# Patient Record
Sex: MALE | Race: AMERICAN INDIAN OR ALASKA NATIVE | NOT HISPANIC OR LATINO | Employment: UNEMPLOYED | URBAN - METROPOLITAN AREA
[De-identification: names, ages, dates, MRNs, and addresses within clinical notes are randomized per-mention and may not be internally consistent; named-entity substitution may affect disease eponyms.]

---

## 2019-06-09 ENCOUNTER — HOSPITAL ENCOUNTER (EMERGENCY)
Facility: CLINIC | Age: 39
Discharge: HOME | End: 2019-06-09
Attending: EMERGENCY MEDICINE | Admitting: EMERGENCY MEDICINE
Payer: COMMERCIAL

## 2019-06-09 VITALS
OXYGEN SATURATION: 100 % | TEMPERATURE: 98.3 F | DIASTOLIC BLOOD PRESSURE: 100 MMHG | BODY MASS INDEX: 26.5 KG/M2 | WEIGHT: 190 LBS | RESPIRATION RATE: 24 BRPM | SYSTOLIC BLOOD PRESSURE: 171 MMHG

## 2019-06-09 DIAGNOSIS — R06.00 DYSPNEA, UNSPECIFIED TYPE: ICD-10-CM

## 2019-06-09 PROCEDURE — 99284 EMERGENCY DEPT VISIT MOD MDM: CPT | Mod: Z6 | Performed by: EMERGENCY MEDICINE

## 2019-06-09 PROCEDURE — 94640 AIRWAY INHALATION TREATMENT: CPT

## 2019-06-09 PROCEDURE — 25000125 ZZHC RX 250

## 2019-06-09 PROCEDURE — 99283 EMERGENCY DEPT VISIT LOW MDM: CPT | Mod: 25

## 2019-06-09 RX ORDER — ALBUTEROL SULFATE 0.83 MG/ML
SOLUTION RESPIRATORY (INHALATION)
Status: COMPLETED
Start: 2019-06-09 | End: 2019-06-09

## 2019-06-09 RX ADMIN — ALBUTEROL SULFATE 2.5 MG: 2.5 SOLUTION RESPIRATORY (INHALATION) at 13:35

## 2019-06-09 ASSESSMENT — ENCOUNTER SYMPTOMS
HEADACHES: 0
NECK STIFFNESS: 0
SHORTNESS OF BREATH: 1
EYE REDNESS: 0
ARTHRALGIAS: 0
DIFFICULTY URINATING: 0
ABDOMINAL PAIN: 0
COUGH: 1
COLOR CHANGE: 0
EYE ITCHING: 1
RHINORRHEA: 1
FEVER: 0
NUMBNESS: 1
CONFUSION: 0

## 2019-06-09 NOTE — ED NOTES
Staff unable to locate patient on unit.  Checked with registration if they saw patient and mother leave.  MD notifed patient eloped before chest xray

## 2019-06-09 NOTE — ED PROVIDER NOTES
"  History     Chief Complaint   Patient presents with     Shortness of Breath     SOB and cough  that started this AM.      The history is provided by the patient.   Shortness of Breath   Associated symptoms: cough (productive; clear mucus)    Associated symptoms: no abdominal pain, no chest pain, no fever and no headaches      Ez Mckeon is a 39 year old male who presents to the ED for shortness of breath. He reports his SOB began this morning at 9:30 AM and is accompanied by tingling in his fingertips and lips as well as a productive cough with clear mucus. He states \"I felt like my brain couldn't tell my body to breathe\". He reports he was unable to take a deep breath. He attempted to alleviate his sx using 2 puffs of his sister's inhaler. He also endorses allergy sx; runny nose, sneezing, and itchy/watery eyes.    I have reviewed the Medications, Allergies, Past Medical and Surgical History, and Social History in the Epic system.    Review of Systems   Constitutional: Negative for fever.   HENT: Positive for rhinorrhea and sneezing. Negative for congestion.    Eyes: Positive for itching (watery). Negative for redness.   Respiratory: Positive for cough (productive; clear mucus) and shortness of breath.    Cardiovascular: Negative for chest pain.   Gastrointestinal: Negative for abdominal pain.   Genitourinary: Negative for difficulty urinating.   Musculoskeletal: Negative for arthralgias and neck stiffness.   Skin: Negative for color change.   Neurological: Positive for numbness (tingling in fingertips and lips). Negative for headaches.   Psychiatric/Behavioral: Negative for confusion.       Physical Exam   BP: (!) 171/100  Heart Rate: 103  Temp: 98.3  F (36.8  C)  Resp: 24  Weight: 86.2 kg (190 lb)  SpO2: 100 %      Physical Exam   Constitutional: No distress.   HENT:   Head: Atraumatic.   Mouth/Throat: Oropharynx is clear and moist.   Eyes: Pupils are equal, round, and reactive to light. No scleral icterus. "   Cardiovascular: Normal heart sounds and intact distal pulses.   Pulmonary/Chest: Breath sounds normal. No respiratory distress.   Abdominal: Soft. Bowel sounds are normal. There is no tenderness.   Musculoskeletal: He exhibits no edema or tenderness.   Skin: Skin is warm. No rash noted. He is not diaphoretic.       ED Course        Procedures             Labs Ordered and Resulted from Time of ED Arrival Up to the Time of Departure from the ED - No data to display         Assessments & Plan (with Medical Decision Making)   39-year-old male presents for evaluation of shortness of breath.  Differential included bronchospasm, PE, pneumothorax, hyperventilation, pneumonia.  Initial exam was reassuring and that he had bilateral lung sounds that appeared normal and oxygen saturation was 100%.  We discussed treatment plan to include chest x-ray.  Patient eloped prior to chest x-ray being obtained.  Patient returns, will resume work-up.    I have reviewed the nursing notes.    I have reviewed the findings, diagnosis, plan and need for follow up with the patient.       Medication List      There are no discharge medications for this visit.         Final diagnoses:   Dyspnea, unspecified type   I, Jeanette Dias, am serving as a trained medical scribe to document services personally performed by Hemant Smith MD, based on the provider's statements to me.      I, Hemant Smith MD, was physically present and have reviewed and verified the accuracy of this note documented by Jeanette Dias.       6/9/2019   [facility], [place], EMERGENCY DEPARTMENT     Brando Smith MD  06/09/19 5286

## 2019-08-11 ENCOUNTER — HOSPITAL ENCOUNTER (INPATIENT)
Facility: CLINIC | Age: 39
LOS: 1 days | Discharge: HOME | End: 2019-08-13
Attending: EMERGENCY MEDICINE | Admitting: PSYCHIATRY & NEUROLOGY
Payer: COMMERCIAL

## 2019-08-11 DIAGNOSIS — T14.91XA SUICIDE ATTEMPT (H): ICD-10-CM

## 2019-08-11 DIAGNOSIS — F10.129 ALCOHOL ABUSE WITH INTOXICATION WITH COMPLICATION (H): ICD-10-CM
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
## 2019-08-11 LAB
ALCOHOL BREATH TEST: 0.25 (ref 0–0.01)
AMPHETAMINES UR QL SCN: NEGATIVE
BARBITURATES UR QL: NEGATIVE
BENZODIAZ UR QL: NEGATIVE
CANNABINOIDS UR QL SCN: NEGATIVE
COCAINE UR QL: NEGATIVE
ETHANOL UR QL SCN: POSITIVE
OPIATES UR QL SCN: NEGATIVE

## 2019-08-11 PROCEDURE — 99285 EMERGENCY DEPT VISIT HI MDM: CPT | Mod: Z6 | Performed by: EMERGENCY MEDICINE

## 2019-08-11 PROCEDURE — 80307 DRUG TEST PRSMV CHEM ANLYZR: CPT | Performed by: FAMILY MEDICINE

## 2019-08-11 PROCEDURE — 80320 DRUG SCREEN QUANTALCOHOLS: CPT | Performed by: FAMILY MEDICINE

## 2019-08-11 PROCEDURE — 99285 EMERGENCY DEPT VISIT HI MDM: CPT | Performed by: EMERGENCY MEDICINE

## 2019-08-11 PROCEDURE — 82075 ASSAY OF BREATH ETHANOL: CPT | Performed by: EMERGENCY MEDICINE

## 2019-08-11 ASSESSMENT — ENCOUNTER SYMPTOMS
HALLUCINATIONS: 0
DYSPHORIC MOOD: 1

## 2019-08-12 PROBLEM — T71.162A SUICIDE ATTEMPT BY HANGING (H): Status: ACTIVE | Noted: 2019-08-12

## 2019-08-12 PROCEDURE — 99223 1ST HOSP IP/OBS HIGH 75: CPT | Mod: AI | Performed by: PSYCHIATRY & NEUROLOGY

## 2019-08-12 PROCEDURE — 12400001 ZZH R&B MH UMMC

## 2019-08-12 RX ORDER — OLANZAPINE 10 MG/1
10 TABLET ORAL
Status: DISCONTINUED | OUTPATIENT
Start: 2019-08-12 | End: 2019-08-13

## 2019-08-12 RX ORDER — BISACODYL 10 MG
10 SUPPOSITORY, RECTAL RECTAL DAILY PRN
Status: DISCONTINUED | OUTPATIENT
Start: 2019-08-12 | End: 2019-08-13 | Stop reason: HOSPADM

## 2019-08-12 RX ORDER — OLANZAPINE 10 MG/2ML
10 INJECTION, POWDER, FOR SOLUTION INTRAMUSCULAR
Status: DISCONTINUED | OUTPATIENT
Start: 2019-08-12 | End: 2019-08-13

## 2019-08-12 RX ORDER — ALUMINA, MAGNESIA, AND SIMETHICONE 2400; 2400; 240 MG/30ML; MG/30ML; MG/30ML
30 SUSPENSION ORAL EVERY 4 HOURS PRN
Status: DISCONTINUED | OUTPATIENT
Start: 2019-08-12 | End: 2019-08-13 | Stop reason: HOSPADM

## 2019-08-12 RX ORDER — TRAZODONE HYDROCHLORIDE 50 MG/1
50 TABLET, FILM COATED ORAL
Status: DISCONTINUED | OUTPATIENT
Start: 2019-08-12 | End: 2019-08-13 | Stop reason: HOSPADM

## 2019-08-12 RX ORDER — HYDROXYZINE HYDROCHLORIDE 25 MG/1
25 TABLET, FILM COATED ORAL EVERY 4 HOURS PRN
Status: DISCONTINUED | OUTPATIENT
Start: 2019-08-12 | End: 2019-08-13 | Stop reason: HOSPADM

## 2019-08-12 RX ORDER — ACETAMINOPHEN 325 MG/1
650 TABLET ORAL EVERY 4 HOURS PRN
Status: DISCONTINUED | OUTPATIENT
Start: 2019-08-12 | End: 2019-08-13 | Stop reason: HOSPADM

## 2019-08-12 ASSESSMENT — ACTIVITIES OF DAILY LIVING (ADL)
BATHING: 0-->INDEPENDENT
LAUNDRY: WITH SUPERVISION
RETIRED_EATING: 0-->INDEPENDENT
HYGIENE/GROOMING: INDEPENDENT
RETIRED_COMMUNICATION: 0-->UNDERSTANDS/COMMUNICATES WITHOUT DIFFICULTY
DRESS: INDEPENDENT
ORAL_HYGIENE: INDEPENDENT
FALL_HISTORY_WITHIN_LAST_SIX_MONTHS: NO
AMBULATION: 0-->INDEPENDENT
COGNITION: 0 - NO COGNITION ISSUES REPORTED
TOILETING: 0-->INDEPENDENT
DRESS: 0-->INDEPENDENT
SWALLOWING: 0-->SWALLOWS FOODS/LIQUIDS WITHOUT DIFFICULTY
TRANSFERRING: 0-->INDEPENDENT

## 2019-08-12 NOTE — ED PROVIDER NOTES
History     Chief Complaint   Patient presents with     Suicidal     Patient drinking this evening. Patient suicidal, and attempted to wrap extension cord around neck this evening. No ligature marks. Respirations even and unlabored. Skin warm and dry.      PALOMO Mckeon is a 39 year old male who who presents to the ED after attempting to hang himself.  He says he has been thinking about this for about 4 weeks when his job hours were dropped significantly.  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  The loss of hours has been very emasculating.  He says he has a teen age daughter who is smoking and drinking.  He feels badly that he doesn't have money to give his daughter things that she wants.  He has been also taking care of his girlfriend's kids.  It has all been very stressful.  He says he is tired of the hamster wheel.  He tried to hang himself several years ago but his dad cut him down. He was drinking today but he doesn't drink regularly.  He says he doesn't believe in taking meds.  He has no providers.       I have reviewed the Medications, Allergies, Past Medical and Surgical History, and Social History in the Epic system.    Review of Systems   Psychiatric/Behavioral: Positive for dysphoric mood and suicidal ideas. Negative for hallucinations.   All other systems reviewed and are negative.      Physical Exam   BP: 122/80  Pulse: 78  Temp: 96.6  F (35.9  C)  Resp: 16  SpO2: 98 %      Physical Exam   Constitutional: He is oriented to person, place, and time. He appears well-developed and well-nourished. No distress.   Normal voice. Tolerates saliva without issue.    HENT:   Head: Normocephalic and atraumatic.   Right Ear: External ear normal.   Left Ear: External ear normal.   Nose: Nose normal.   Eyes: EOM are normal.   Neck: Normal range of motion.   No abrasions/injury   Cardiovascular: Normal rate, regular rhythm and normal heart sounds.   Pulmonary/Chest: Effort normal and breath sounds normal.    Musculoskeletal: Normal range of motion.   Neurological: He is alert and oriented to person, place, and time.   Skin: Skin is warm and dry. He is not diaphoretic.   Psychiatric: His speech is normal and behavior is normal. Judgment normal. He is not actively hallucinating. Cognition and memory are normal. He exhibits a depressed mood. He expresses suicidal ideation. He expresses suicidal plans. He is attentive.   Nursing note and vitals reviewed.      ED Course        Procedures           Labs Ordered and Resulted from Time of ED Arrival Up to the Time of Departure from the ED   DRUG ABUSE SCREEN 6 CHEM DEP URINE ([facility]) - Abnormal; Notable for the following components:       Result Value    Ethanol Qual Urine Positive (*)     All other components within normal limits   ALCOHOL BREATH TEST POCT - Abnormal; Notable for the following components:    Alcohol Breath Test 0.248 (*)     All other components within normal limits            Assessments & Plan (with Medical Decision Making)   The patient attempted to hang himself today but his dad walked in on him before he could complete the act.  He did not have stress on his neck yet so denies injury.  He is overwhelmed with life's hamster wheel.  He says he has been thinking about killing himself for about 4 weeks.  I am placing him on a 72 hour hold and will admit to inpatient mental health.  The patient says he drank tonight but does not drink regularly.      I have reviewed the nursing notes.    I have reviewed the findings, diagnosis, plan and need for follow up with the patient.    New Prescriptions    No medications on file       Final diagnoses:   Suicide attempt (H)       8/11/2019   [facility], [place], EMERGENCY DEPARTMENT     Vernell Hood MD  08/11/19 6827

## 2019-08-12 NOTE — PROGRESS NOTES
08/12/19 0257   Patient Belongings   Did you bring any home meds/supplements to the hospital?  No   Patient Belongings locker   Patient Belongings Put in Hospital Secure Location (Security or Locker, etc.) clothing;earrings;money (see comment);shoes;other (see comments)   Belongings Search Yes   Clothing Search Yes   Second Staff Amarilis     In Locker:  Black shorts, grey shirt, white shoes, black socks, small plastic baggie with $1.19 in change, one earring, and a green lighter.   There was no wallet, cell phone, or ID cards.       A               Admission:  I am responsible for any personal items that are not sent to the safe or pharmacy.  [facility] is not responsible for loss, theft or damage of any property in my possession.    Signature:  _________________________________ Date: _______  Time: _____                                              Staff Signature:  ____________________________ Date: ________  Time: _____      2nd Staff person, if patient is unable/unwilling to sign:    Signature: ________________________________ Date: ________  Time: _____     Discharge:  [facility] has returned all of my personal belongings:    Signature: _________________________________ Date: ________  Time: _____                                          Staff Signature:  ____________________________ Date: ________  Time: _____

## 2019-08-12 NOTE — ED NOTES
Bed: HW01  Expected date:   Expected time:   Means of arrival:   Comments:  Jacques 427  39 M  Suicide attempt, ETOH

## 2019-08-12 NOTE — H&P
Admitted:     08/11/2019      CHIEF COMPLAINT:  The patient is a 39-year-old man who was admitted due to worsening depression and suicide attempt.      HISTORY OF PRESENT ILLNESS:  The patient is a 39-year-old man who was admitted to the hospital after his father stopped him from wrapping an extension cord around his neck.  In the ER, he did not have any ligature marks, no problems with respiration.  No sign that he actually did any harm.  The patient indicates that his father stopped him.  This is the second time in his life that his father has stopped a suicide attempt.  Previously he cut off an extension cord that he had wrapped around his neck.  He was not hospitalized at that time, just evaluated in the emergency room and sent home.  The patient indicates that he has been feeling more depressed over the last few weeks.  He had his hours cut back at work because of problems with the business.  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  He states that they have been slowly losing business to the bigger national chains, and because of that there just has not been as much work for him.  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  Because of the problems with work, he has been unable to pay the bills as well as she has in the past, so he feels like he is not being a good provider.  Additionally, he has a 15-year-old daughter who has been drinking and smoking.  She is wanting to live with his mom.  He is worried about her behaviors.  States that his ex-wife has smoked marijuana with her before, which really frustrates him as his ex-wife also has custody of a 6-year-old daughter of his that he has not been able to see since they got a divorce.  The patient was very angry about the situation.  He reports to feeling more depressed just over the last 2  "weeks prior to having his work hours cut back and having financial difficulties.  He was feeling fine.  He is not really interested in medications.  He does say that he went to Confucianism the last few weeks, which is helpful.  He is somewhat open to a therapist.  He denies suicidal thoughts currently.  Feels that he would be safe to go home, but understands that we want to watch him for a little bit given that he was admitted on a hold and did have a suicide attempt.      PAST PSYCHIATRIC HISTORY:  No past inpatient hospitalizations, does not currently have a therapist or psychiatrist.   One past suicide attempt via hanging several years ago.  He also reports an episode where he burned himself.  He states that he did so because his wife at the time would always self-deprecate, had low self-esteem because of her appearance, and so he thought if he made himself \"ugly\" that she would feel better.      PAST MEDICAL HISTORY:  The patient denies any chronic or acute medical issues.      SUBSTANCE HISTORY:  The patient smokes a pack of cigarettes daily.  He drinks socially, but not to the point of intoxication.  Denies any illicit drug use.      PHYSICAL REVIEW OF SYSTEMS:  The patient currently denies any problems on 10-point review of systems except as noted in HPI.      FAMILY HISTORY:  The patient reports that his sister  due to liver failure of unknown cause.  He denies it was alcohol-related.      SOCIAL HISTORY:  The patient currently lives with his girlfriend and her 9-year-old and 4-year-old children.  He is , as noted in the HPI.  He is a  for a moving company, as noted in HPI.  He states that he is close to his mom and dad, and his 15-year-old daughter likes to stay with his mom.      ALLERGIES:  No known drug allergies.      PRIOR TO ADMISSION MEDICATIONS:  None.      LABORATORY DATA:  Urine toxicology is positive for alcohol.  Alcohol breath was 0.248.  Urine toxicology is negative for " amphetamines, cocaine, opiates, cannabinoids, barbiturates, benzodiazepines.      VITAL SIGNS:  Temperature 97, pulse is 72, respiratory rate is 16, blood pressure is 146/81, oxygen saturation 98% on room air.      PHYSICAL EXAMINATION:  I have reviewed physical exam as documented by emergency room physician, Vernell Hood, dated 08/11/2019.  I have no additional findings at this time.      MENTAL STATUS EXAMINATION:  The patient is awake, alert, oriented to person, place and date.  He is wearing hospital scrubs.  He is cooperative throughout the interview with good eye contact.  Speech is normal in rate and volume.  Language is intact for word usage and sentence structure.  He has no psychomotor agitation or retardation.  Muscle strength and tone and gait and station are within normal limits.  Mood is depressed.  Affect is congruent to mood.  Thought process is linear and goal oriented.  Associations are intact.  Thought content is currently negative for suicidal thoughts, no signs of psychosis.  Recent and remote memory are intact.  Fund of knowledge is adequate.  Attention and concentration are intact.  Insight is fair, judgment is limited.      DIAGNOSES:   1.  Suicide attempt via strangulation.   2.  Unspecified depressive disorder.  Differential includes major depressive disorder versus adjustment disorder with depressed mood versus acute reaction to stress.   3.  Cigarette nicotine dependence, currently in withdrawal.      PLAN:  The patient is not interested in medication at this time.  He does not support symptoms of depression prior to having his hours cut, so it is not clear he would benefit regardless.  However, he would likely benefit from therapy to help with coping strategies in response to these stressful situations as his past behaviors have demonstrated significant self-destructive and self-harm responses to stress, even when not significantly depressed.  As such,  will help with referral  for treatment.      LEGAL:  The patient was admitted on a 72-hour hold.  Anticipate that we will drop the hold and discharge the patient; however, I do want to watch him overnight just to make sure that we do not see signs that he is at imminent risk.      DISPOSITION:  Anticipate patient will discharge to home after a sufficient period of time and evaluation.         CHAUNCEY GONZALES MD             D: 2019   T: 2019   MT: WT      Name:     JEREMY RIVERA   MRN:      [id number]        Account:      [id number]   :      1980        Admitted:     2019                   Document: [id number]       cc: [facility]

## 2019-08-12 NOTE — PROGRESS NOTES
Patient is calm and cooperative. He was in his room for most of the day but he came out for meals and to call his girlfriend. He has not been going to groups and has just been sleeping most of the day. He denies depression anxiety and SI/SIB. All he has to say is that he is sad and has a lot on his plate. No other concerns at this time.

## 2019-08-12 NOTE — ED NOTES
I have performed an in person assessment of the patient.  Based on this assessment the patient no longer requires a one on one attendant at this point in time.        Vernell Hood MD  08/11/19 8175

## 2019-08-12 NOTE — PROGRESS NOTES
Initial Psychosocial Assessment    I have reviewed the chart, met with the patient, and developed Care Plan.      Presenting Problem:  The patient is a 39 year-old male who presented to the ED after trying to hang himself.  He lost hours at his employment and is having trouble with daughter who is smoking and drinking. Feels inadequate unable to provide for his family.  Doesn't believe in taking meds.  Patient attempted to hang himself (no ligature marks noted) on day of admit but father found him. On admit to the ED he asked a  if he had a gun?   asked why?  Patient said he wanted to attack the  so he could shoot him. He was guarded on admit and his Utox was positive for alcohol - BAL 0.248 on admit.  Patient on 72 Hour Hold on 8/11 at 1126 and ends on 8/15 at 0001.     History of Mental Health and Chemical Dependency:  No othr inpatient admission here at [facility].    Family Description (Constellation, Family Psychiatric History):  The patient is single with a girlfriend.  He has a teenage daughter and there is stress between them due to her smoking and drinking.    Significant Life Events (Illness, Abuse, Trauma, Death):  Stress of life.    Living Situation:  Patient lives in [place].    Educational Background:  High School    Occupational History:  Works at [employer]    Financial Status:  Wages    Legal Issues:  None    Ethnic/Cultural Issues:      Spiritual Orientation:       Service History:  None    Current Treatment Providers are:  None    Social Service Assessment/Plan:  The patient needs psychiatric assessment, medication management, detox complete and stabilization of his mood.  He will be encouraged to be out of his room and participating in all of the unit activities and therapeutic programming.  If patient wants CD treatment, he is uninsured and will need a Rule 25 assessment through [facility]. CTC to  ensure that he has a comprehensve care plan in place at discharge.

## 2019-08-12 NOTE — ED NOTES
ED to Behavioral Floor Handoff    SITUATION  Ez Mckeon is a 39 year old male who speaks English and lives in a home with others The patient arrived in the ED by ambulance from home with a complaint of Suicidal (Patient drinking this evening. Patient suicidal, and attempted to wrap extension cord around neck this evening. No ligature marks. Respirations even and unlabored. Skin warm and dry. )  .The patient's current symptoms started/worsened 1 day(s) ago and during this time the symptoms have increased.   In the ED, pt was diagnosed with   Final diagnoses:   Suicide attempt (H)        Initial vitals were: BP: 122/80  Pulse: 78  Temp: 96.6  F (35.9  C)  Resp: 16  SpO2: 98 %   --------  Is the patient diabetic? No   If yes, last blood glucose? --     If yes, was this treated in the ED? --  --------  Is the patient inebriated (ETOH) Yes or Impaired on other substances? No  MSSA done? No  Last MSSA score: --    Were withdrawal symptoms treated? No  Does the patient have a seizure history? No. If yes, date of most recent seizure--  --------  Is the patient patient experiencing suicidal ideation? Attempted suicide with extension cord    Homicidal ideation? denies current or recent homicidal ideation or behaviors.    Self-injurious behavior/urges? denies current or recent self injurious behavior or ideation.  ------  Was pt aggressive in the ED No, please see note about statement made to . Patient has remained cooperative.  Was a code called No  Is the pt now cooperative? Yes  -------  Meds given in ED: Medications - No data to display   Family present during ED course? No  Family currently present? No    BACKGROUND  Does the patient have a cognitive impairment or developmental disability? No  Allergies: No Known Allergies.   Social demographics are   Social History     Socioeconomic History     Marital status: Single     Spouse name: None     Number of children: None     Years of education: None      Highest education level: None   Occupational History     None   Social Needs     Financial resource strain: None     Food insecurity:     Worry: None     Inability: None     Transportation needs:     Medical: None     Non-medical: None   Tobacco Use     Smoking status: Current Every Day Smoker     Packs/day: 1.50     Smokeless tobacco: Never Used   Substance and Sexual Activity     Alcohol use: Yes     Comment: occasionally     Drug use: Yes     Types: Marijuana     Comment: occasional marijuana use     Sexual activity: None   Lifestyle     Physical activity:     Days per week: None     Minutes per session: None     Stress: None   Relationships     Social connections:     Talks on phone: None     Gets together: None     Attends Confucianist service: None     Active member of club or organization: None     Attends meetings of clubs or organizations: None     Relationship status: None     Intimate partner violence:     Fear of current or ex partner: None     Emotionally abused: None     Physically abused: None     Forced sexual activity: None   Other Topics Concern     None   Social History Narrative     None        ASSESSMENT  Labs results   Labs Ordered and Resulted from Time of ED Arrival Up to the Time of Departure from the ED   DRUG ABUSE SCREEN 6 CHEM DEP URINE ([facility]) - Abnormal; Notable for the following components:       Result Value    Ethanol Qual Urine Positive (*)     All other components within normal limits   ALCOHOL BREATH TEST POCT - Abnormal; Notable for the following components:    Alcohol Breath Test 0.248 (*)     All other components within normal limits      Imaging Studies: No results found for this or any previous visit (from the past 24 hour(s)).   Most recent vital signs /80   Pulse 78   Temp 96.6  F (35.9  C) (Oral)   Resp 16   SpO2 98%    Abnormal labs/tests/findings requiring intervention:---   Pain control: pt had none  Nausea control: pt had none    RECOMMENDATION  Are any  infection precautions needed (MRSA, VRE, etc.)? No If yes, what infection? --  ---  Does the patient have mobility issues? independently. If yes, what device does the pt use? ---  ---  Is patient on 72 hour hold or commitment? Yes If on 72 hour hold, have hold and rights been given to patient? Yes  Are admitting orders written if after 10 p.m. ?N/A  Tasks needing to be completed:---     Kelley de la torre--    [phone] [place] [facility]   [phone] [facility]

## 2019-08-12 NOTE — ED NOTES
"Patient presents to ED via [facility] ambulance service following suicide attempt with extension cord. Patient at celebration tonight and reports drinking. Patient put extension cord around his neck. No ligature marks noted. Patient's airway patent. Respirations even and unlabored. Upon arrival to ED patient asked a  if he had a gun, when asked why he replied \"I'm going to attack you so you can shoot me.\"   "

## 2019-08-13 VITALS
BODY MASS INDEX: 26.67 KG/M2 | RESPIRATION RATE: 16 BRPM | DIASTOLIC BLOOD PRESSURE: 81 MMHG | HEART RATE: 72 BPM | SYSTOLIC BLOOD PRESSURE: 146 MMHG | WEIGHT: 190.5 LBS | OXYGEN SATURATION: 98 % | HEIGHT: 71 IN | TEMPERATURE: 97.1 F

## 2019-08-13 LAB
ALBUMIN SERPL-MCNC: 3.3 G/DL (ref 3.4–5)
ALP SERPL-CCNC: 58 U/L (ref 40–150)
ALT SERPL W P-5'-P-CCNC: 25 U/L (ref 0–70)
AMMONIA PLAS-SCNC: 37 UMOL/L (ref 10–50)
ANION GAP SERPL CALCULATED.3IONS-SCNC: 4 MMOL/L (ref 3–14)
AST SERPL W P-5'-P-CCNC: 15 U/L (ref 0–45)
BASOPHILS # BLD AUTO: 0 10E9/L (ref 0–0.2)
BASOPHILS NFR BLD AUTO: 0.3 %
BILIRUB SERPL-MCNC: 0.4 MG/DL (ref 0.2–1.3)
BUN SERPL-MCNC: 16 MG/DL (ref 7–30)
CALCIUM SERPL-MCNC: 8.3 MG/DL (ref 8.5–10.1)
CHLORIDE SERPL-SCNC: 106 MMOL/L (ref 94–109)
CHOLEST SERPL-MCNC: 173 MG/DL
CO2 SERPL-SCNC: 29 MMOL/L (ref 20–32)
CREAT SERPL-MCNC: 0.71 MG/DL (ref 0.66–1.25)
DIFFERENTIAL METHOD BLD: ABNORMAL
EOSINOPHIL # BLD AUTO: 0.2 10E9/L (ref 0–0.7)
EOSINOPHIL NFR BLD AUTO: 2.9 %
ERYTHROCYTE [DISTWIDTH] IN BLOOD BY AUTOMATED COUNT: 12.5 % (ref 10–15)
GFR SERPL CREATININE-BSD FRML MDRD: >90 ML/MIN/{1.73_M2}
GLUCOSE SERPL-MCNC: 92 MG/DL (ref 70–99)
HCT VFR BLD AUTO: 41.5 % (ref 40–53)
HDLC SERPL-MCNC: 87 MG/DL
HGB BLD-MCNC: 13.8 G/DL (ref 13.3–17.7)
IMM GRANULOCYTES # BLD: 0 10E9/L (ref 0–0.4)
IMM GRANULOCYTES NFR BLD: 0.1 %
LDLC SERPL CALC-MCNC: 64 MG/DL
LYMPHOCYTES # BLD AUTO: 2.6 10E9/L (ref 0.8–5.3)
LYMPHOCYTES NFR BLD AUTO: 37.7 %
MCH RBC QN AUTO: 32.5 PG (ref 26.5–33)
MCHC RBC AUTO-ENTMCNC: 33.3 G/DL (ref 31.5–36.5)
MCV RBC AUTO: 98 FL (ref 78–100)
MONOCYTES # BLD AUTO: 0.6 10E9/L (ref 0–1.3)
MONOCYTES NFR BLD AUTO: 8.5 %
NEUTROPHILS # BLD AUTO: 3.5 10E9/L (ref 1.6–8.3)
NEUTROPHILS NFR BLD AUTO: 50.5 %
NONHDLC SERPL-MCNC: 86 MG/DL
NRBC # BLD AUTO: 0 10*3/UL
NRBC BLD AUTO-RTO: 0 /100
PLATELET # BLD AUTO: 173 10E9/L (ref 150–450)
POTASSIUM SERPL-SCNC: 4.4 MMOL/L (ref 3.4–5.3)
PROT SERPL-MCNC: 6.6 G/DL (ref 6.8–8.8)
RBC # BLD AUTO: 4.24 10E12/L (ref 4.4–5.9)
SODIUM SERPL-SCNC: 139 MMOL/L (ref 133–144)
TRIGL SERPL-MCNC: 109 MG/DL
TSH SERPL DL<=0.005 MIU/L-ACNC: 0.75 MU/L (ref 0.4–4)
WBC # BLD AUTO: 7 10E9/L (ref 4–11)

## 2019-08-13 PROCEDURE — 84443 ASSAY THYROID STIM HORMONE: CPT | Performed by: NURSE PRACTITIONER

## 2019-08-13 PROCEDURE — 80053 COMPREHEN METABOLIC PANEL: CPT | Performed by: NURSE PRACTITIONER

## 2019-08-13 PROCEDURE — 36415 COLL VENOUS BLD VENIPUNCTURE: CPT | Performed by: NURSE PRACTITIONER

## 2019-08-13 PROCEDURE — 85025 COMPLETE CBC W/AUTO DIFF WBC: CPT | Performed by: NURSE PRACTITIONER

## 2019-08-13 PROCEDURE — 82140 ASSAY OF AMMONIA: CPT | Performed by: NURSE PRACTITIONER

## 2019-08-13 PROCEDURE — 80061 LIPID PANEL: CPT | Performed by: NURSE PRACTITIONER

## 2019-08-13 PROCEDURE — 99239 HOSP IP/OBS DSCHRG MGMT >30: CPT | Performed by: PSYCHIATRY & NEUROLOGY

## 2019-08-13 ASSESSMENT — ACTIVITIES OF DAILY LIVING (ADL)
ORAL_HYGIENE: INDEPENDENT
HYGIENE/GROOMING: INDEPENDENT
LAUNDRY: WITH SUPERVISION
DRESS: INDEPENDENT

## 2019-08-13 ASSESSMENT — MIFFLIN-ST. JEOR: SCORE: 1806.23

## 2019-08-13 NOTE — DISCHARGE SUMMARY
Psychiatric Discharge Summary    Ez Mckeon MRN# [id number]   Age: 39 year old YOB: 1980     Date of Admission:  8/11/2019  Date of Discharge:  8/13/2019  Admitting Physician:  Nilesh Dennis MD  Discharge Physician:  Nilesh Dennis MD          Event Leading to Hospitalization:   The patient is a 39-year-old man who was admitted due to worsening depression and suicide attempt.        See Admission note by Nilesh Dennis MD on 8/12/2019 for additional details.          Diagnoses:     1.  Suicide attempt via strangulation.   2.  Unspecified depressive disorder.  Differential includes major depressive disorder versus adjustment disorder with depressed mood versus acute reaction to stress.   3.  Cigarette nicotine dependence, currently in withdrawal.   4.  Cluster B traits versus personality disorder likely         Labs:     Results for orders placed or performed during the hospital encounter of 08/11/19   Drug abuse screen 6 urine (tox)   Result Value Ref Range    Amphetamine Qual Urine Negative NEG^Negative    Barbiturates Qual Urine Negative NEG^Negative    Benzodiazepine Qual Urine Negative NEG^Negative    Cannabinoids Qual Urine Negative NEG^Negative    Cocaine Qual Urine Negative NEG^Negative    Ethanol Qual Urine Positive (A) NEG^Negative    Opiates Qualitative Urine Negative NEG^Negative   CBC with platelets differential   Result Value Ref Range    WBC 7.0 4.0 - 11.0 10e9/L    RBC Count 4.24 (L) 4.4 - 5.9 10e12/L    Hemoglobin 13.8 13.3 - 17.7 g/dL    Hematocrit 41.5 40.0 - 53.0 %    MCV 98 78 - 100 fl    MCH 32.5 26.5 - 33.0 pg    MCHC 33.3 31.5 - 36.5 g/dL    RDW 12.5 10.0 - 15.0 %    Platelet Count 173 150 - 450 10e9/L    Diff Method Automated Method     % Neutrophils 50.5 %    % Lymphocytes 37.7 %    % Monocytes 8.5 %    % Eosinophils 2.9 %    % Basophils 0.3 %    % Immature Granulocytes 0.1 %    Nucleated RBCs 0 0 /100    Absolute Neutrophil 3.5 1.6 - 8.3 10e9/L    Absolute  Lymphocytes 2.6 0.8 - 5.3 10e9/L    Absolute Monocytes 0.6 0.0 - 1.3 10e9/L    Absolute Eosinophils 0.2 0.0 - 0.7 10e9/L    Absolute Basophils 0.0 0.0 - 0.2 10e9/L    Abs Immature Granulocytes 0.0 0 - 0.4 10e9/L    Absolute Nucleated RBC 0.0    Comprehensive metabolic panel   Result Value Ref Range    Sodium 139 133 - 144 mmol/L    Potassium 4.4 3.4 - 5.3 mmol/L    Chloride 106 94 - 109 mmol/L    Carbon Dioxide 29 20 - 32 mmol/L    Anion Gap 4 3 - 14 mmol/L    Glucose 92 70 - 99 mg/dL    Urea Nitrogen 16 7 - 30 mg/dL    Creatinine 0.71 0.66 - 1.25 mg/dL    GFR Estimate >90 >60 mL/min/[1.73_m2]    GFR Estimate If Black >90 >60 mL/min/[1.73_m2]    Calcium 8.3 (L) 8.5 - 10.1 mg/dL    Bilirubin Total 0.4 0.2 - 1.3 mg/dL    Albumin 3.3 (L) 3.4 - 5.0 g/dL    Protein Total 6.6 (L) 6.8 - 8.8 g/dL    Alkaline Phosphatase 58 40 - 150 U/L    ALT 25 0 - 70 U/L    AST 15 0 - 45 U/L   TSH with free T4 reflex and/or T3 as indicated   Result Value Ref Range    TSH 0.75 0.40 - 4.00 mU/L   Lipid panel   Result Value Ref Range    Cholesterol 173 <200 mg/dL    Triglycerides 109 <150 mg/dL    HDL Cholesterol 87 >39 mg/dL    LDL Cholesterol Calculated 64 <100 mg/dL    Non HDL Cholesterol 86 <130 mg/dL   Ammonia   Result Value Ref Range    Ammonia 37 10 - 50 umol/L   Alcohol breath test POCT   Result Value Ref Range    Alcohol Breath Test 0.248 (A) 0.00 - 0.01              Consults:   No consultations were requested during this admission         Hospital Course:   Ez Mckeon was admitted to [facility] with attending Nilesh Dennis MD on a 72 hour mental health hold. The patient was placed under status 15 (15 minute checks) to ensure patient safety.     From assessment in H&P:  The patient is not interested in medication at this time.  He does not support symptoms of depression prior to having his hours cut, so it is not clear he would benefit regardless.  However, he would likely benefit from therapy to help with coping strategies  in response to these stressful situations as his past behaviors have demonstrated significant self-destructive and self-harm responses to stress, even when not significantly depressed.  As such,  will help with referral for treatment.    I saw the patient again on 8/13. He continued to not have interest in medications. He was not having suicidal thoughts. Given his history of impulsive reactions to stress followed by long periods of safety, I suspect that this will follow the same course. He likely is at little to no increased risk over his baseline at this time. Without adequate therapy/support outside of the hospital, he will continue to be at risk for these impulsive actions which frankly could occur at any time. He would not develop the necessary skills to mitigate this risk in the hospital as it will take more intensive individual therapy over a longer period of time. The patient understood this and did agree to follow through with recommended therapy. He understood that failure to follow through with treatment would increase his long term risk for harm. He agreed to take on this risk now and at the time of discharge had no thoughts to harm self. He cited his girlfriend and children as protective. He was grateful to be alive. Because of this, I elected to not pursue commitment. I dropped the hold and he requested discharge.     Ez Mckeon was released to home.         Discharge Medications:   There are no discharge medications for this patient.           Psychiatric Examination:   Appearance:  awake, alert, adequately groomed and dressed in hospital scrubs  Attitude:  cooperative  Eye Contact:  good  Mood:  sad   Affect:  intensity is blunted  Speech:  clear, coherent and normal prosody  Psychomotor Behavior:  no evidence of tardive dyskinesia, dystonia, or tics  Thought Process:  logical, linear and goal oriented  Associations:  no loose associations  Thought Content:  no evidence of suicidal  ideation or homicidal ideation and no evidence of psychotic thought  Insight:  limited  Judgment:  limited  Oriented to:  time, person, and place  Attention Span and Concentration:  intact  Recent and Remote Memory:  intact  Language: Able to name objects, Able to repeat phrases and Able to read and write  Fund of Knowledge: appropriate  Muscle Strength and Tone: normal  Gait and Station: Normal         Discharge Plan:   Patient was discharged to home. He was provided contact information to set up therapy on his own as fits his scheduled. He was advised to return to the hospital with any increase in depressive symptoms or any suicidal thinking. It was also explained that intoxication would put him at high risk for self harm, and as such, he was advised to avoid all drugs and alcohol at this time. The patient understood and agreed with these recommendations. He planned to follow through as recommended and understood that failure to do so, would increase his risk of self harm.    Attestation:  The patient has been seen and evaluated by me,  Nilesh Dennis MD  On the day of discharge, I saw the patient and performed the above examination, reviewed discharge medications, reviewed follow-up plan, and assessed safety for discharge. I spent greater than 30 minutes on these tasks.

## 2019-08-13 NOTE — PROGRESS NOTES
The patient is being discharged. He has refused medications during his stay.  He has been provided with resources to obtain therapy if he so chooses.  His girlfriend is here to take him home.

## 2019-08-13 NOTE — DISCHARGE INSTRUCTIONS
Behavioral Discharge Planning and Instructions      Summary:  You were admitted on 8/11/2019  due to being depressed about your life and abusing alcohol..  You were treated by Dr. Nilesh Dennis MD and discharged on 8/13/19 from [facility] to home      Principal Diagnosis: Unspecified Depressive Disorder      Health Care Follow-up Appointments: NO MEDICATIONS ARE BEING PRESCRIBED.  YOU DID NOT WANT TO TAKE THEM.    If you are interested in getting into therapy here are several resources--  [address]  [place]  [address]  [phone]    [facility]  [address] [place], [place]  [phone]      Attend all scheduled appointments with your outpatient providers. Call at least 24 hours in advance if you need to reschedule an appointment to ensure continued access to your outpatient providers.   Major Treatments, Procedures and Findings:  You were provided with: a psychiatric assessment, assessed for medical stability, medication evaluation and/or management, group therapy and milieu management    Symptoms to Report: losing more sleep, mood getting worse or thoughts of suicide    Early warning signs can include: increased depression or anxiety sleep disturbances increased thoughts or behaviors of suicide or self-harm     Safety and Wellness:  Take all medicines as directed.  Make no changes unless your doctor suggests them.      Follow treatment recommendations.  Refrain from alcohol and non-prescribed drugs.  If there is a concern for safety, call 911.    Resources:   Crisis Intervention: 162.465.1396 or [phone] (TTY: [phone]).  Call anytime for help.  [employer] [place] [employer] (www.[place].org): 980.621.1222 or 285-854-1597.      The treatment team has appreciated the opportunity to work with you.     If you have any questions or concerns our unit number is [phone].

## 2019-08-13 NOTE — PLAN OF CARE
"48 hour nursing observation     Suicide attempt (H) [T14.91XA]    Admit Date: 8/11/2019    Patient evaluation continues. Assessed mood,anxiety,thoughts and behavior. Patient is progressing towards goals. Patient is encouraged to participate in groups and assisted to develop healthy coping skills.      BP (!) 146/81   Pulse 72   Temp 97.1  F (36.2  C) (Tympanic)   Resp 16   Ht 1.803 m (5' 11\")   Wt 86.4 kg (190 lb 8 oz)   SpO2 98%   BMI 26.57 kg/m      Patient reports depression level of 0 and anxiety level of 0 with 10 being the worst. Patient's affect is flat, blunted but states he feels better & wants discharge today. Patient denies SI, SIB, HI, no wishes to be dead & feels hopeful about the future. Patient states concentration is \"good\", tracking well & denies racing thoughts.  Patient denies auditory or visual hallucinations. Patient reports sleeping 9 hours last night and appetite \"great\".      Patient is declining to have any meds prescribed at this time. Patient attended some groups, quiet, visible milieu, calm & cooperative.  ADLs are good.  Writer reviewed AVS with pt & given copy-states he understands.  Please see AVS for further details.  Pt discharged to home at 1245 with all of his belongings.  Transported by his GF.  Stable discharge status.        "

## 2019-08-13 NOTE — PROGRESS NOTES
Patient has been isolative with other patients and staff. His partner visited him and it appeared to be a positive exchange. After his visitors, he was more present in the milieu. He made several phone calls and watch television for a while. Patient has been calm and pleasant with staff, responses are generally short, eye contact is minimal with staff.      08/12/19 2300   Behavioral Health   Hallucinations denies / not responding to hallucinations   Thinking intact   Orientation person: oriented;place: oriented;date: oriented   Insight admits / accepts   Judgement impaired   Eye Contact cultural specific   Affect blunted, flat;sad   Mood depressed;mood is calm   Physical Appearance/Attire attire appropriate to age and situation   Hygiene well groomed   Suicidality   (pt denies at this time)   1. Wish to be Dead No   2. Non-Specific Active Suicidal Thoughts  No   Enviromental Risk Factors None   Self Injury other (see comment)  (pt denies at this time)   Psycho Education   Type of Intervention 1:1 intervention   Response participates with encouragement   Hours 0.5   Treatment Detail check in

## 2020-04-11 NOTE — PLAN OF CARE
"Patient is a 39 year old male admitted from [facility] ED after a suicide attempt by hanging. He was found by his father with an extention cord wrapped around his neck. When assessing patient he stated \"I was thinking bad thoughts about hurting myself\" when asking how he would do it he stated \"Hang\". Patient denied ever attempting suicide however has had one prior attempt by hanging as well and was found by his father. Patient said it's been a long time since he's felt suicidal \"Maybe several years back\". He presents as gaurded, withdrawn, and in denial of illness. UTOX positive for ETOH, patient will be put on an MSSA.    Patient denies prior history of IP MH or CD treatments. Denies HI or AVH. No medical concerns. Smokes 1 pack/per of cigarettes. He is on a 72 HH. Voices strong support system from girlfriend (who he lives with) and his father. Patient was A&Ox4, cooperative. Admission profile is not complete, suicidal assessment has one part to be done, patient was wanting to get to bed. Patient was asked if he wanted anyone to be notified he is here, he stated his girlfriend.  " From: Marcell Russo  To: Hope Ornelas MD  Sent: 4/11/2020 11:03 AM CDT  Subject: After-Visit Question    This message is being sent by Dary Russo on behalf of Marcell Denney,  Just wanted to pass along an extra huge thank you to Dr Ornelas for all the time and reassurance she gave us this visit especially with the sleep advice. These past 6 months have been a roller coaster (weight issues, eye gunk, feeding issues, sleeping troubles!) and we are so grateful to all the nurses and Dr Ornelas for the great care and patience. We will be showing this in an actual way once we can get out and about again.     With appreciation,   Dary Baez

## 2023-11-06 ENCOUNTER — OFFICE VISIT (OUTPATIENT)
Dept: URGENT CARE | Facility: URGENT CARE | Age: 43
End: 2023-11-06
Payer: COMMERCIAL

## 2023-11-06 VITALS
BODY MASS INDEX: 31.5 KG/M2 | HEART RATE: 60 BPM | SYSTOLIC BLOOD PRESSURE: 177 MMHG | WEIGHT: 225 LBS | HEIGHT: 71 IN | DIASTOLIC BLOOD PRESSURE: 122 MMHG | TEMPERATURE: 98.1 F | OXYGEN SATURATION: 99 %

## 2023-11-06 DIAGNOSIS — D17.9 LIPOMA, UNSPECIFIED SITE: Primary | ICD-10-CM

## 2023-11-06 DIAGNOSIS — F41.9 ANXIETY: ICD-10-CM

## 2023-11-06 DIAGNOSIS — I10 BENIGN HYPERTENSION: ICD-10-CM

## 2023-11-06 LAB
ALBUMIN SERPL BCG-MCNC: 4.2 G/DL (ref 3.5–5.2)
ALP SERPL-CCNC: 66 U/L (ref 40–129)
ALT SERPL W P-5'-P-CCNC: 48 U/L (ref 0–70)
ANION GAP SERPL CALCULATED.3IONS-SCNC: 6 MMOL/L (ref 7–15)
AST SERPL W P-5'-P-CCNC: 49 U/L (ref 0–45)
BILIRUB SERPL-MCNC: 0.3 MG/DL
BUN SERPL-MCNC: 13 MG/DL (ref 6–20)
CALCIUM SERPL-MCNC: 9.3 MG/DL (ref 8.6–10)
CHLORIDE SERPL-SCNC: 104 MMOL/L (ref 98–107)
CREAT SERPL-MCNC: 0.96 MG/DL (ref 0.67–1.17)
DEPRECATED HCO3 PLAS-SCNC: 31 MMOL/L (ref 22–29)
EGFRCR SERPLBLD CKD-EPI 2021: >90 ML/MIN/1.73M2
GLUCOSE SERPL-MCNC: 96 MG/DL (ref 70–99)
POTASSIUM SERPL-SCNC: 4.7 MMOL/L (ref 3.4–5.3)
PROT SERPL-MCNC: 7.1 G/DL (ref 6.4–8.3)
SODIUM SERPL-SCNC: 141 MMOL/L (ref 135–145)

## 2023-11-06 PROCEDURE — 99204 OFFICE O/P NEW MOD 45 MIN: CPT | Performed by: PHYSICIAN ASSISTANT

## 2023-11-06 PROCEDURE — 36415 COLL VENOUS BLD VENIPUNCTURE: CPT | Performed by: PHYSICIAN ASSISTANT

## 2023-11-06 PROCEDURE — 80053 COMPREHEN METABOLIC PANEL: CPT | Performed by: PHYSICIAN ASSISTANT

## 2023-11-06 RX ORDER — HYDROXYZINE HYDROCHLORIDE 25 MG/1
25 TABLET, FILM COATED ORAL 3 TIMES DAILY PRN
Qty: 30 TABLET | Refills: 0 | Status: SHIPPED | OUTPATIENT
Start: 2023-11-06 | End: 2023-12-06

## 2023-11-06 RX ORDER — HYDROCHLOROTHIAZIDE 25 MG/1
25 TABLET ORAL DAILY
Qty: 30 TABLET | Refills: 0 | Status: SHIPPED | OUTPATIENT
Start: 2023-11-06 | End: 2024-01-23

## 2023-11-06 NOTE — LETTER
November 6, 2023      Ez Mckeon  [address] [facility] [address]        To Whom It May Concern:    Ez Mckeon was seen in our clinic. He is starting blood pressure medication and he may donate plasma while on it.       Sincerely,        Dasha Reynoso PA-C

## 2023-11-06 NOTE — PROGRESS NOTES
"URGENT CARE VISIT:    SUBJECTIVE:   Ez Mckeon is a 43 year old male presenting with a chief complaint of elevated blood pressure for months.   He denies the following symptoms: headache and vision changes. Course of illness is worsening. Treatment measures tried include None tried with no relief of symptoms. He has been unable to donate plasma due to uncontrolled blood pressure. He has been feeling anxious for a while.     He is also having growth on right back for many months. It is painful intermittently. Hurts when he moves shoulder blade. No treatment tried. Symptoms are stable.     PMH: No past medical history on file.  Allergies: Hydrocodone-acetaminophen   Medications:   Current Outpatient Medications   Medication Sig Dispense Refill    hydrochlorothiazide (HYDRODIURIL) 25 MG tablet Take 1 tablet (25 mg) by mouth daily for 30 days 30 tablet 0    hydrOXYzine (ATARAX) 25 MG tablet Take 1 tablet (25 mg) by mouth 3 times daily as needed for anxiety 30 tablet 0     Social History:   Social History     Tobacco Use    Smoking status: Every Day     Packs/day: 1.5     Types: Cigarettes    Smokeless tobacco: Never   Substance Use Topics    Alcohol use: Yes     Comment: occasionally       ROS:  Review of systems negative except as stated above.    OBJECTIVE:  BP (!) 177/122   Pulse 60   Temp 98.1  F (36.7  C) (Temporal)   Ht 1.803 m (5' 11\")   Wt 102.1 kg (225 lb)   SpO2 99%   BMI 31.38 kg/m    GENERAL APPEARANCE: healthy, alert and no distress  EYES: EOMI,  PERRL, conjunctiva clear  HENT: ear canals and TM's normal.  Nose and mouth without ulcers, erythema or lesions  NECK: supple, nontender, no lymphadenopathy  RESP: lungs clear to auscultation - no rales, rhonchi or wheezes  CV: regular rates and rhythm, normal S1 S2, no murmur noted  SKIN: 6 cm mobile nodule under right scapular. Non-erythematous    Labs:    Results for orders placed or performed in visit on 11/06/23   Comprehensive metabolic panel     " Status: Abnormal   Result Value Ref Range    Sodium 141 135 - 145 mmol/L    Potassium 4.7 3.4 - 5.3 mmol/L    Carbon Dioxide (CO2) 31 (H) 22 - 29 mmol/L    Anion Gap 6 (L) 7 - 15 mmol/L    Urea Nitrogen 13.0 6.0 - 20.0 mg/dL    Creatinine 0.96 0.67 - 1.17 mg/dL    GFR Estimate >90 >60 mL/min/1.73m2    Calcium 9.3 8.6 - 10.0 mg/dL    Chloride 104 98 - 107 mmol/L    Glucose 96 70 - 99 mg/dL    Alkaline Phosphatase 66 40 - 129 U/L    AST 49 (H) 0 - 45 U/L    ALT 48 0 - 70 U/L    Protein Total 7.1 6.4 - 8.3 g/dL    Albumin 4.2 3.5 - 5.2 g/dL    Bilirubin Total 0.3 <=1.2 mg/dL       ASSESSMENT:    ICD-10-CM    1. Lipoma, unspecified site  D17.9 Adult Dermatology  Referral      2. Benign hypertension  I10 Comprehensive metabolic panel     Comprehensive metabolic panel     hydrochlorothiazide (HYDRODIURIL) 25 MG tablet      3. Anxiety  F41.9 hydrOXYzine (ATARAX) 25 MG tablet          PLAN:  30 minutes spent by me on the date of the encounter doing chart review, review of outside records, review of test results, interpretation of tests, patient visit, and documentation   Patient Instructions   HTN:  Patient was educated on the natural course of condition. Conservative measures discussed including lifestyle changes (exercise and healthy diet). Started on hydrochlorothiazide. Side effects discussed. Keep track of your blood pressures and write them down. CMP was unremarkable. See your primary provider in 2 weeks for further management. Seek emergency care if you develop severe headache, dizziness, numbness/tingling, weakness, confusion, or chest pain.      Anxiety:  Patient was educated on anxiety.  Conservative measures discussed including counseling, stress reducing techniques, and prescription medications. Trial of Atarax. See your primary care provider if symptoms worsen or do not improve in 2 weeks. Seek emergency care if you develop suicidal ideation.      Lipoma:  Patient was educated on the natural course of  condition. No evidence of infection. Conservative measures discussed including warm compresses, and over-the-counter analgesics (Tylenol or Ibuprofen). Referred to dermatology as it is causing him shoulder blade pain. Seek emergency care if you develop fever, severe swelling, or increased redness.     Patient verbalized understanding and is agreeable to plan. The patient was discharged ambulatory and in stable condition.    Dasah Reynoso PA-C ....................  11/6/2023   5:52 PM

## 2023-11-06 NOTE — PATIENT INSTRUCTIONS
HTN:  Patient was educated on the natural course of condition. Conservative measures discussed including lifestyle changes (exercise and healthy diet). Started on hydrochlorothiazide. Side effects discussed. Keep track of your blood pressures and write them down. See your primary provider in 2 weeks for further management. Seek emergency care if you develop severe headache, dizziness, numbness/tingling, weakness, confusion, or chest pain.      Lipoma:  Patient was educated on the natural course of condition. No evidence of infection. Conservative measures discussed including warm compresses, and over-the-counter analgesics (Tylenol or Ibuprofen). Referred to dermatology as it is causing him shoulder blade pain. Seek emergency care if you develop fever, severe swelling, or increased redness.

## 2023-12-05 ENCOUNTER — TELEPHONE (OUTPATIENT)
Dept: URGENT CARE | Facility: URGENT CARE | Age: 43
End: 2023-12-05
Payer: COMMERCIAL

## 2023-12-05 NOTE — LETTER
December 5, 2023      Ez Mckeon  [address] [facility] [address]        Dear ,    We are writing to inform you of your test results.    {results letter list:[id number]}    No results found from the In Basket message.    If you have any questions or concerns, please call the clinic at the number listed above.       Sincerely,

## 2023-12-05 NOTE — TELEPHONE ENCOUNTER
Medication Question or Refill        What medication are you calling about (include dose and sig)?: Blood pressure medication, Hydrochlorothiazide 25 Mg    Preferred Pharmacy:       [id number] - [place], [place] - [address] [facility]  [address] [facility] [facility] [address]  Phone: [phone] Fax: [phone]      Controlled Substance Agreement on file:   CSA -- Patient Level:    CSA: None found at the patient level.       Who prescribed the medication?: Dasha Reynoso ( [facility])    Do you need a refill? Yes    When did you use the medication last? 2 wks ago    Patient offered an appointment? No    Do you have any questions or concerns?  Yes: The  provider wanted patient to take the blood pressure medication and then follow up within 30 day with PCP, however, patient lost his medication about 5 days in, so he has not been taking them. Patient is asking for a prescription to be sent again to pharmacy.     Please contact patient to let him know Rx have been sent.       Okay to leave a detailed message?: Yes at Cell number on file:    Telephone Information:   Mobile [phone]

## 2023-12-07 NOTE — TELEPHONE ENCOUNTER
"Called patient, spoke to patient. Relayed provider's message. Patient's fiancee fred states that patient need rx refill because \"we lost his meds and only took 5 out of 30 days supply\". Medical Assistant informed patient and patient's fiancee that  providers do not refill rx and if needing refill-- to schedule an appointment with their pcp. Patient's fiancee states \"we are declining medication for patient so what the fuck are we suppose to fucking do\". Medical Assistant continued to state no refills are done in urgent care but with their pcp. Patient's fiancee aggressively states \"I will report you and the [facility] clinic to corporate for declining of prescription for patient because you're urgent care and care about money and numbers. We don't want to wait fucking forever just to see a doctor\". Medical Assistant offered central scheduling phone number to schedule an appointment but patient's fiancee declined and states \"I already know that fucking basic ass number\". Patient's fiancee angrily states \"so you guys are declining fucking medication, his blood pressure has been damn high since, if continue he'll have a fucking heart attack!\". Throughout the entire call, patient's fiancee use of profanity language which made the medical assistant uncomfortable being on the call, so medical assistant maintained calm and continued to relay message for patient and patient's fiancee to follow up with their pcp, any refills and other concerns or questions they may have can be answered by their pcp and informed them both due to the use of language, medical assistant will be ending the call.     Lary [facility] MA on 12/7/2023 at 4:45 PM  "

## 2024-01-23 ENCOUNTER — OFFICE VISIT (OUTPATIENT)
Dept: FAMILY MEDICINE | Facility: CLINIC | Age: 44
End: 2024-01-23
Payer: COMMERCIAL

## 2024-01-23 VITALS
WEIGHT: 224 LBS | HEIGHT: 71 IN | DIASTOLIC BLOOD PRESSURE: 102 MMHG | HEART RATE: 79 BPM | SYSTOLIC BLOOD PRESSURE: 148 MMHG | OXYGEN SATURATION: 96 % | TEMPERATURE: 98.1 F | BODY MASS INDEX: 31.36 KG/M2 | RESPIRATION RATE: 16 BRPM

## 2024-01-23 DIAGNOSIS — I10 BENIGN HYPERTENSION: Primary | ICD-10-CM

## 2024-01-23 DIAGNOSIS — F41.1 GAD (GENERALIZED ANXIETY DISORDER): ICD-10-CM

## 2024-01-23 PROCEDURE — 99214 OFFICE O/P EST MOD 30 MIN: CPT | Performed by: PHYSICIAN ASSISTANT

## 2024-01-23 RX ORDER — HYDROXYZINE HYDROCHLORIDE 25 MG/1
25 TABLET, FILM COATED ORAL 3 TIMES DAILY PRN
Qty: 30 TABLET | Refills: 1 | Status: SHIPPED | OUTPATIENT
Start: 2024-01-23

## 2024-01-23 RX ORDER — LOSARTAN POTASSIUM 50 MG/1
50 TABLET ORAL DAILY
Qty: 90 TABLET | Refills: 3 | Status: SHIPPED | OUTPATIENT
Start: 2024-01-23 | End: 2025-01-17

## 2024-01-23 NOTE — PROGRESS NOTES
"  Assessment & Plan     (I10) Benign hypertension  (primary encounter diagnosis)  Comment: Chronic uncontrolled  Plan: losartan (COZAAR) 50 MG tablet        Blood pressure not controlled today. Advised to start new medication regimen and follow up in 4 weeks with me, bring BP cuff with to clinic. Stressed the importance of regular blood pressure monitoring outside of clinic, regular aerobic exercise, low salt diet, refraining from smoking/tobacco products and moderation of alcohol use.  Side effects of medication discussed.  [facility] if any worsening symptoms              Nicotine/Tobacco Cessation  He reports that he has been smoking. He has been smoking an average of 1.5 packs per day. He has never used smokeless tobacco.  Nicotine/Tobacco Cessation Plan  Information offered: Patient not interested at this time      BMI  Estimated body mass index is 31.69 kg/m  as calculated from the following:    Height as of this encounter: 1.791 m (5' 10.5\").    Weight as of this encounter: 101.6 kg (224 lb).   Weight management plan: Discussed healthy diet and exercise guidelines        Jayda Hui is a 43 year old, presenting for the following health issues:  Hypertension      1/23/2024     1:57 PM   Additional Questions   Roomed by Lyric Zheng MA   Accompanied by Lovely hammonds     History of Present Illness       Reason for visit:  Primary health and high blood pressure    He eats 2-3 servings of fruits and vegetables daily.He consumes 3 sweetened beverage(s) daily.He exercises with enough effort to increase his heart rate 9 or less minutes per day.  He exercises with enough effort to increase his heart rate 3 or less days per week.   He is taking medications regularly.         Hypertension Follow-up    Do you check your blood pressure regularly outside of the clinic? No   Are you following a low salt diet? No  Are your blood pressures ever more than 140 on the top number (systolic) OR " "more   than 90 on the bottom number (diastolic), for example 140/90? Yes    Patient was seen at urgent care in November for elevated blood pressure which was noted at plasma donation.  Patient was started on hydrochlorothiazide, used approximately 5 of the tablets then lost medication, he was unable to get this filled based on not having a PCP so he has been out of the medication.  Patient has remained asymptomatic from a cardiac standpoint no chest pain shortness of breath headache or vision changes.              Objective    Pulse 79   Temp 98.1  F (36.7  C) (Temporal)   Resp 16   Ht 1.791 m (5' 10.5\")   Wt 101.6 kg (224 lb)   SpO2 96%   BMI 31.69 kg/m    Body mass index is 31.69 kg/m .  Physical Exam   GENERAL: alert and no distress  NECK: no adenopathy, no asymmetry, masses, or scars  RESP: lungs clear to auscultation - no rales, rhonchi or wheezes  CV: regular rate and rhythm, normal S1 S2, no S3 or S4, no murmur, click or rub, no peripheral edema  ABDOMEN: soft, nontender, no hepatosplenomegaly, no masses and bowel sounds normal  MS: no gross musculoskeletal defects noted, no edema            Signed Electronically by: Rashad Xavier PA-C    "

## 2024-04-14 ENCOUNTER — HEALTH MAINTENANCE LETTER (OUTPATIENT)
Age: 44
End: 2024-04-14

## 2024-07-29 ENCOUNTER — ANCILLARY PROCEDURE (OUTPATIENT)
Dept: GENERAL RADIOLOGY | Facility: CLINIC | Age: 44
End: 2024-07-29
Attending: PHYSICIAN ASSISTANT
Payer: MEDICAID

## 2024-07-29 ENCOUNTER — OFFICE VISIT (OUTPATIENT)
Dept: URGENT CARE | Facility: URGENT CARE | Age: 44
End: 2024-07-29
Payer: MEDICAID

## 2024-07-29 VITALS
DIASTOLIC BLOOD PRESSURE: 129 MMHG | BODY MASS INDEX: 32.54 KG/M2 | WEIGHT: 230 LBS | RESPIRATION RATE: 18 BRPM | TEMPERATURE: 97.5 F | SYSTOLIC BLOOD PRESSURE: 203 MMHG | HEART RATE: 88 BPM | OXYGEN SATURATION: 99 %

## 2024-07-29 DIAGNOSIS — R03.0 ELEVATED BLOOD PRESSURE READING: Primary | ICD-10-CM

## 2024-07-29 DIAGNOSIS — M11.20 CHONDROCALCINOSIS: ICD-10-CM

## 2024-07-29 DIAGNOSIS — M25.462 KNEE EFFUSION, LEFT: ICD-10-CM

## 2024-07-29 DIAGNOSIS — M25.562 ACUTE PAIN OF LEFT KNEE: ICD-10-CM

## 2024-07-29 PROCEDURE — 99204 OFFICE O/P NEW MOD 45 MIN: CPT | Performed by: PHYSICIAN ASSISTANT

## 2024-07-29 PROCEDURE — 73562 X-RAY EXAM OF KNEE 3: CPT | Mod: TC | Performed by: RADIOLOGY

## 2024-07-29 RX ORDER — LOSARTAN POTASSIUM 50 MG/1
50 TABLET ORAL DAILY
Qty: 45 TABLET | Refills: 0 | Status: SHIPPED | OUTPATIENT
Start: 2024-07-29 | End: 2024-09-06

## 2024-07-29 RX ORDER — OXYCODONE AND ACETAMINOPHEN 5; 325 MG/1; MG/1
1 TABLET ORAL EVERY 6 HOURS PRN
Qty: 12 TABLET | Refills: 0 | Status: SHIPPED | OUTPATIENT
Start: 2024-07-29 | End: 2024-08-01

## 2024-07-29 NOTE — PROGRESS NOTES
SUBJECTIVE:   Ez Mckeon is a 44 year old male presenting with a chief complaint of   Chief Complaint   Patient presents with    Knee Pain     Twisted left knee about 2 months ago. Swelling and discomfort since, has been wearing a brace he had as he has hx injury to left knee.        He is an established patient of [facility].  Patient presents with left knee pain x 2 months.  Initially twisted while wrestling.  States was injured 10 years ago as well and wore a brace.  Patient states he has not had time to have his knee evaluated.    (2) hx of HTN.  He was seen at  on 1/23/24 and was given cozaar.  He took it, ran out and never followed up.      Treatment:  compression sleeve.      Review of Systems    No past medical history on file.  No family history on file.  Current Outpatient Medications   Medication Sig Dispense Refill    losartan (COZAAR) 50 MG tablet Take 1 tablet (50 mg) by mouth daily 45 tablet 0    oxyCODONE-acetaminophen (PERCOCET) 5-325 MG tablet Take 1 tablet by mouth every 6 hours as needed for pain 12 tablet 0    hydrOXYzine HCl (ATARAX) 25 MG tablet Take 1 tablet (25 mg) by mouth 3 times daily as needed for itching (Patient not taking: Reported on 7/29/2024) 30 tablet 1    losartan (COZAAR) 50 MG tablet Take 1 tablet (50 mg) by mouth daily for 360 days (Patient not taking: Reported on 7/29/2024) 90 tablet 3     Social History     Tobacco Use    Smoking status: Every Day     Current packs/day: 1.50     Types: Cigarettes    Smokeless tobacco: Never   Substance Use Topics    Alcohol use: Yes     Comment: occasionally       OBJECTIVE  BP (!) 203/129 (BP Location: Left arm, Cuff Size: Adult Regular)   Pulse 88   Temp 97.5  F (36.4  C) (Tympanic)   Resp 18   Wt 104.3 kg (230 lb)   SpO2 99%   BMI 32.54 kg/m      Physical Exam  Musculoskeletal:      Comments: Left knee:  effusion, tenderness to palpation of medial and lateral joint lines.  Pitting edema to lower extremity (brace was on tight).   Negative charo.         Labs:  Results for orders placed or performed in visit on 07/29/24 (from the past 24 hour(s))   XR Knee Left 3 Views    Narrative    EXAM: XR KNEE LEFT 3 VIEWS  LOCATION: [facility]  DATE: 7/29/2024    INDICATION:  Acute pain of left knee  COMPARISON: None.      Impression    IMPRESSION:     No acute left knee fracture or dislocation is identified. No joint space narrowing. There is chondrocalcinosis involving both menisci, with suggestion of medial meniscal body extrusion. Findings are suspicious for an underlying meniscal tear. This could   be confirmed with MRI. There is a joint effusion.    NOTE: ABNORMAL REPORT    THE DICTATION ABOVE DESCRIBES AN ABNORMALITY FOR WHICH FOLLOW-UP IS NEEDED.        X-Ray was done, my findings are: Xrays reviewed by myself and independently interpreted.  Any significant discrepancies with official radiologic read, patient will be notified.        ASSESSMENT:      ICD-10-CM    1. Elevated blood pressure reading  R03.0 losartan (COZAAR) 50 MG tablet      2. Acute pain of left knee  M25.562 XR Knee Left 3 Views     Orthopedic  Referral     oxyCODONE-acetaminophen (PERCOCET) 5-325 MG tablet     CANCELED: Orthopedic  Referral      3. Knee effusion, left  M25.462       4. Chondrocalcinosis  M11.20            Medical Decision Making:    Differential Diagnosis:  Meniscal tear, effusion,    Serious Comorbid Conditions:  Adult:   reviewed    PLAN:    Ortho referral.  Stop wearing knee brace.  (2) RF on cozsaar.  See PCP asap.  Discussed the importance of staying on medications.  Discussed reasons to seek immediate medical attention.  Additionally if no improvement or worsening in one week, may follow up with PCP and/or UC.        Followup:    If not improving or if condition worsens, follow up with your Primary Care Provider, In 2  day(s) follow up with  Ortho.  Follow up with PCP within 45 days.      There are no Patient  Instructions on file for this visit.

## 2024-08-29 ENCOUNTER — HOSPITAL ENCOUNTER (EMERGENCY)
Facility: CLINIC | Age: 44
Discharge: HOME | End: 2024-08-29
Attending: EMERGENCY MEDICINE | Admitting: EMERGENCY MEDICINE
Payer: MEDICAID

## 2024-08-29 ENCOUNTER — APPOINTMENT (OUTPATIENT)
Dept: GENERAL RADIOLOGY | Facility: CLINIC | Age: 44
End: 2024-08-29
Attending: EMERGENCY MEDICINE
Payer: MEDICAID

## 2024-08-29 ENCOUNTER — APPOINTMENT (OUTPATIENT)
Dept: ULTRASOUND IMAGING | Facility: CLINIC | Age: 44
End: 2024-08-29
Attending: EMERGENCY MEDICINE
Payer: MEDICAID

## 2024-08-29 VITALS
BODY MASS INDEX: 32.54 KG/M2 | SYSTOLIC BLOOD PRESSURE: 171 MMHG | WEIGHT: 230 LBS | HEART RATE: 73 BPM | OXYGEN SATURATION: 97 % | RESPIRATION RATE: 16 BRPM | TEMPERATURE: 97.7 F | DIASTOLIC BLOOD PRESSURE: 95 MMHG

## 2024-08-29 DIAGNOSIS — M25.562 LEFT KNEE PAIN, UNSPECIFIED CHRONICITY: ICD-10-CM

## 2024-08-29 DIAGNOSIS — M25.462 EFFUSION OF LEFT KNEE JOINT: ICD-10-CM

## 2024-08-29 DIAGNOSIS — M71.22 BAKER'S CYST OF KNEE, LEFT: ICD-10-CM

## 2024-08-29 PROCEDURE — 29505 APPLICATION LONG LEG SPLINT: CPT | Mod: LT | Performed by: EMERGENCY MEDICINE

## 2024-08-29 PROCEDURE — 93971 EXTREMITY STUDY: CPT | Mod: 26 | Performed by: RADIOLOGY

## 2024-08-29 PROCEDURE — 99284 EMERGENCY DEPT VISIT MOD MDM: CPT | Mod: 25 | Performed by: EMERGENCY MEDICINE

## 2024-08-29 PROCEDURE — 73562 X-RAY EXAM OF KNEE 3: CPT | Mod: LT

## 2024-08-29 PROCEDURE — 99284 EMERGENCY DEPT VISIT MOD MDM: CPT | Performed by: EMERGENCY MEDICINE

## 2024-08-29 PROCEDURE — 250N000013 HC RX MED GY IP 250 OP 250 PS 637: Performed by: EMERGENCY MEDICINE

## 2024-08-29 PROCEDURE — 93971 EXTREMITY STUDY: CPT | Mod: LT

## 2024-08-29 RX ORDER — IBUPROFEN 600 MG/1
600 TABLET, FILM COATED ORAL ONCE
Status: COMPLETED | OUTPATIENT
Start: 2024-08-29 | End: 2024-08-29

## 2024-08-29 RX ORDER — IBUPROFEN 600 MG/1
600 TABLET, FILM COATED ORAL EVERY 6 HOURS PRN
Qty: 30 TABLET | Refills: 0 | Status: SHIPPED | OUTPATIENT
Start: 2024-08-29

## 2024-08-29 RX ORDER — OXYCODONE HYDROCHLORIDE 5 MG/1
5 TABLET ORAL ONCE
Status: COMPLETED | OUTPATIENT
Start: 2024-08-29 | End: 2024-08-29

## 2024-08-29 RX ADMIN — OXYCODONE HYDROCHLORIDE 5 MG: 5 TABLET ORAL at 11:09

## 2024-08-29 RX ADMIN — IBUPROFEN 600 MG: 600 TABLET, FILM COATED ORAL at 11:08

## 2024-08-29 ASSESSMENT — ACTIVITIES OF DAILY LIVING (ADL)
ADLS_ACUITY_SCORE: 35
ADLS_ACUITY_SCORE: 35
ADLS_ACUITY_SCORE: 33
ADLS_ACUITY_SCORE: 35
ADLS_ACUITY_SCORE: 33

## 2024-08-29 ASSESSMENT — COLUMBIA-SUICIDE SEVERITY RATING SCALE - C-SSRS
6. HAVE YOU EVER DONE ANYTHING, STARTED TO DO ANYTHING, OR PREPARED TO DO ANYTHING TO END YOUR LIFE?: NO
1. IN THE PAST MONTH, HAVE YOU WISHED YOU WERE DEAD OR WISHED YOU COULD GO TO SLEEP AND NOT WAKE UP?: NO
1. IN THE PAST MONTH, HAVE YOU WISHED YOU WERE DEAD OR WISHED YOU COULD GO TO SLEEP AND NOT WAKE UP?: NO
6. HAVE YOU EVER DONE ANYTHING, STARTED TO DO ANYTHING, OR PREPARED TO DO ANYTHING TO END YOUR LIFE?: NO
2. HAVE YOU ACTUALLY HAD ANY THOUGHTS OF KILLING YOURSELF IN THE PAST MONTH?: NO
2. HAVE YOU ACTUALLY HAD ANY THOUGHTS OF KILLING YOURSELF IN THE PAST MONTH?: NO

## 2024-08-29 NOTE — DISCHARGE INSTRUCTIONS
Take ibuprofen as needed for pain.  Wear knee immobilizer when up.  Use crutches-weightbearing as tolerated.    Follow-up with orthopedics.  Orthopedics Clinic (phone: [phone])    Return if fever or other concerns.

## 2024-08-29 NOTE — ED TRIAGE NOTES
Reports L knee injury in June of 2024 reports he got an xray that showed calcium build up and floating around and then missed his reg MD appt last week. He reports the pain is worse he couldn't sleep last night and it is getting harder to do his regular ADL's pt able to bear some weight. Not taking anything for pain.

## 2024-08-29 NOTE — ED PROVIDER NOTES
[facility] EMERGENCY DEPARTMENT ([facility])    8/29/24      ED PROVIDER NOTE   Hall[address] E       History   No chief complaint on file.    HPI  Ez Mckeon is a 44 year old male who presents to the emergency department with worsening left knee pain.  He injured his left knee approximately 2 months ago when he twisted it.  He was seen in urgent care at [facility] on 7/29/2024.  He had a radiograph performed which was concerning for findings consistent with a medial meniscus tear.  Patient was referred to orthopedics but did not yet follow-up.  He denies any subsequent injury.  He also notes that he has had ongoing left leg swelling distal to his knee.  He denies any fever.  No recent URI symptoms.  No chest pain or dyspnea.  No cough.  No abdominal pain.  No nausea or vomiting.    Past Medical History  Past Medical History:   Diagnosis Date    Hypertension      History reviewed. No pertinent surgical history.  ibuprofen (ADVIL/MOTRIN) 600 MG tablet  losartan (COZAAR) 50 MG tablet  hydrOXYzine HCl (ATARAX) 25 MG tablet  losartan (COZAAR) 50 MG tablet      Allergies   Allergen Reactions    Hydrocodone-Acetaminophen Itching     Family History  History reviewed. No pertinent family history.  Social History   Social History     Tobacco Use    Smoking status: Every Day     Current packs/day: 1.50     Types: Cigarettes    Smokeless tobacco: Never   Substance Use Topics    Alcohol use: Yes     Comment: occasionally    Drug use: Yes     Types: Marijuana     Comment: occasional marijuana use      A medically appropriate review of systems was performed with pertinent positives and negatives noted in the HPI, and all other systems negative.    Physical Exam   BP: (!) 153/99  Pulse: 102  Temp: 97.7  F (36.5  C)  Resp: 18  Weight: 104.3 kg (230 lb)  SpO2: 95 %  Physical Exam  Vitals and nursing note reviewed.   Constitutional:       Appearance: Normal appearance.   HENT:      Head: Normocephalic and atraumatic.    Eyes:      Pupils: Pupils are equal, round, and reactive to light.   Cardiovascular:      Rate and Rhythm: Normal rate.      Pulses: Normal pulses.   Pulmonary:      Effort: Pulmonary effort is normal.   Musculoskeletal:      Left knee: No bony tenderness. Decreased range of motion. Tenderness present over the medial joint line. No lateral joint line, MCL, LCL, ACL, PCL or patellar tendon tenderness.      Instability Tests: Anterior drawer test negative. Posterior drawer test negative.      Left lower le+ Edema present.      Left ankle: Normal.      Left Achilles Tendon: Normal.      Left foot: Normal. Normal range of motion and normal capillary refill. Normal pulse.   Neurological:      Mental Status: He is alert.           ED Course, Procedures, & Data      Reviewed [place] urgent care visit from 2024.  Reviewed associated left knee radiograph which revealed chondrocalcinosis involving both menisci with suggestion of medial meniscus body extrusion.    Procedures             Results for orders placed or performed during the hospital encounter of 24   XR Knee Left 3 Views     Status: None    Narrative    XR KNEE LEFT 3 VIEWS  2024 12:04 PM     HISTORY: Pain, trauma  COMPARISON: Radiograph 2024      Impression    IMPRESSION:  No acute fracture or subluxation. Joint spaces are preserved. Moderate  knee joint effusion. Meniscal chondrocalcinosis with medial location  of the medial meniscus body, unchanged from prior.    MAYKEL LUBIN MD         SYSTEM ID:  [id number]   US Lower Extremity Venous Duplex Left     Status: None (Preliminary result)    Narrative    Exam: Ultrasound of the deep venous system of left leg dated 2024  12:02 PM    Clinical information: Swelling-evaluate for DVT    Comparison: Knee radiograph 2024    Ordering provider: Kirby Jones    Technique: Gray-scale evaluation with compression and Doppler  assessment of deep venous system for spontaneous and phasic  "flow, as  well as the presence of distal augmentation. Color flow images  obtained as needed. Gray-scale images with compression of the great  saphenous vein obtained as needed.    Findings:    Left leg:    CFV: Thrombus: No, Phasic: Yes  Femoral vein, proximal: Thrombus: No, Phasic: Yes  Femoral vein, mid: Thrombus: No, Phasic: Yes  Femoral vein, distal: Thrombus: No, Phasic: Yes  Popliteal vein: Thrombus: No, Phasic: Yes  PTV: Thrombus: Thrombus: No  Peroneal vein: Thrombus: No    Hypoechoic fluid collection at the popliteal fossa measuring 6.5 x 1.4  x 3.0 cm, likely a large Baker's cyst. Along the anterior lateral  aspect of the knee there is a moderate-large joint effusion as seen on  recent radiograph.      Impression    Impression:    Left le. No deep venous thrombosis.   2. Suspected large Baker's cyst. Moderate-large knee joint effusion as  seen on recent radiograph.    Reference: \"Duplex Ultrasound in the Diagnosis of Lower-Extremity Deep  Venous Thrombosis\"- Shayy Sullivan MD, S; Albert Ruiz MD  (Circulation. 2014;129:917-921. http://circ.ahajournals.org )         Results for orders placed or performed during the hospital encounter of 24   XR Knee Left 3 Views     Status: None    Narrative    XR KNEE LEFT 3 VIEWS  2024 12:04 PM     HISTORY: Pain, trauma  COMPARISON: Radiograph 2024      Impression    IMPRESSION:  No acute fracture or subluxation. Joint spaces are preserved. Moderate  knee joint effusion. Meniscal chondrocalcinosis with medial location  of the medial meniscus body, unchanged from prior.    MAYKEL LUBIN MD         SYSTEM ID:  [id number]   US Lower Extremity Venous Duplex Left     Status: None (Preliminary result)    Narrative    Exam: Ultrasound of the deep venous system of left leg dated 2024  12:02 PM    Clinical information: Swelling-evaluate for DVT    Comparison: Knee radiograph 2024    Ordering provider: Kirby Jones    Technique: " "Gray-scale evaluation with compression and Doppler  assessment of deep venous system for spontaneous and phasic flow, as  well as the presence of distal augmentation. Color flow images  obtained as needed. Gray-scale images with compression of the great  saphenous vein obtained as needed.    Findings:    Left leg:    CFV: Thrombus: No, Phasic: Yes  Femoral vein, proximal: Thrombus: No, Phasic: Yes  Femoral vein, mid: Thrombus: No, Phasic: Yes  Femoral vein, distal: Thrombus: No, Phasic: Yes  Popliteal vein: Thrombus: No, Phasic: Yes  PTV: Thrombus: Thrombus: No  Peroneal vein: Thrombus: No    Hypoechoic fluid collection at the popliteal fossa measuring 6.5 x 1.4  x 3.0 cm, likely a large Baker's cyst. Along the anterior lateral  aspect of the knee there is a moderate-large joint effusion as seen on  recent radiograph.      Impression    Impression:    Left le. No deep venous thrombosis.   2. Suspected large Baker's cyst. Moderate-large knee joint effusion as  seen on recent radiograph.    Reference: \"Duplex Ultrasound in the Diagnosis of Lower-Extremity Deep  Venous Thrombosis\"- Shayy Sullivan MD, S; Albert Ruiz MD  (Circulation. 2014;129:917-921. http://circ.ahajournals.org )     Medications   ibuprofen (ADVIL/MOTRIN) tablet 600 mg (600 mg Oral $Given 24 1108)   oxyCODONE (ROXICODONE) tablet 5 mg (5 mg Oral $Given 24 1109)     Labs Ordered and Resulted from Time of ED Arrival to Time of ED Departure - No data to display  XR Knee Left 3 Views   Final Result   IMPRESSION:   No acute fracture or subluxation. Joint spaces are preserved. Moderate   knee joint effusion. Meniscal chondrocalcinosis with medial location   of the medial meniscus body, unchanged from prior.      MAYKEL LUBIN MD            SYSTEM ID:  [id number]      US Lower Extremity Venous Duplex Left   Preliminary Result   Impression:      Left le. No deep venous thrombosis.    2. Suspected large Baker's cyst. " "Moderate-large knee joint effusion as   seen on recent radiograph.      Reference: \"Duplex Ultrasound in the Diagnosis of Lower-Extremity Deep   Venous Thrombosis\"- Shayy Sullivan MD, S; Albert Ruiz MD   (Circulation. 2014;129:917-921. http://circ.ahajournals.org )             Critical care was not performed.     Medical Decision Making  The patient's presentation was of moderate complexity (an acute complicated injury).    The patient's evaluation involved:  review of external note(s) from 1 sources (see separate area of note for details)  review of 1 test result(s) ordered prior to this encounter (see separate area of note for details)  ordering and/or review of 2 test(s) in this encounter (see separate area of note for details)    The patient's management necessitated moderate risk (prescription drug management including medications given in the ED).    Assessment & Plan    44 year old male to the emergency department with ongoing left knee pain and swelling.  He had an injury approximately 2 months ago.  He was evaluated at urgent care 1 month ago and had radiographic findings concerning for medial meniscus tear.  He presents now with ongoing pain and swelling.  He denies any infectious symptoms.  He also has swelling of his left lower extremity.  Differential includes fracture, meniscus tear, ligamentous injury, DVT, Baker's cyst.  Radiograph does not reveal any acute fracture.  He has persistent findings as identified on radiograph 1 month ago.  Ultrasound does not reveal any evidence for DVT.  Does have a large Baker's cyst as well as a joint effusion.  Patient does not have any signs or symptoms concerning for infection so do not suspect septic arthritis.  Will place patient in a knee immobilizer to use when up.  Crutches-weightbearing as tolerated recommended.  Ibuprofen prescribed for pain.  Additional referral for orthopedic follow-up placed.  Return precautions provided.  Suspect symptoms " related to his likely meniscus tear and Baker's cyst.    I have reviewed the nursing notes. I have reviewed the findings, diagnosis, plan and need for follow up with the patient.    New Prescriptions    IBUPROFEN (ADVIL/MOTRIN) 600 MG TABLET    Take 1 tablet (600 mg) by mouth every 6 hours as needed for moderate pain.       Final diagnoses:   Left knee pain, unspecified chronicity   Effusion of left knee joint   Baker's cyst of knee, left     Chart documentation was completed with Dragon voice-recognition software. Even though reviewed, this chart may still contain some grammatical, spelling, and word errors.     [facility] EMERGENCY DEPARTMENT  8/29/2024     Kirby Jones MD  08/29/24 1285

## 2024-08-29 NOTE — ED TRIAGE NOTES
Triage Assessment (Adult)       Row Name 08/29/24 0924          Triage Assessment    Airway WDL WDL        Respiratory WDL    Respiratory WDL WDL        Skin Circulation/Temperature WDL    Skin Circulation/Temperature WDL WDL        Cardiac WDL    Cardiac WDL WDL        Peripheral/Neurovascular WDL    Peripheral Neurovascular WDL WDL        Cognitive/Neuro/Behavioral WDL    Cognitive/Neuro/Behavioral WDL WDL

## 2024-09-06 DIAGNOSIS — R03.0 ELEVATED BLOOD PRESSURE READING: ICD-10-CM

## 2024-09-06 RX ORDER — LOSARTAN POTASSIUM 50 MG/1
50 TABLET ORAL DAILY
Qty: 45 TABLET | Refills: 0 | Status: SHIPPED | OUTPATIENT
Start: 2024-09-06

## 2024-09-11 ENCOUNTER — PRE VISIT (OUTPATIENT)
Dept: ORTHOPEDICS | Facility: CLINIC | Age: 44
End: 2024-09-11

## 2024-09-11 ENCOUNTER — OFFICE VISIT (OUTPATIENT)
Dept: ORTHOPEDICS | Facility: CLINIC | Age: 44
End: 2024-09-11
Attending: EMERGENCY MEDICINE
Payer: COMMERCIAL

## 2024-09-11 ENCOUNTER — TELEPHONE (OUTPATIENT)
Dept: SCHEDULING | Facility: CLINIC | Age: 44
End: 2024-09-11

## 2024-09-11 DIAGNOSIS — M25.462 EFFUSION OF LEFT KNEE JOINT: ICD-10-CM

## 2024-09-11 DIAGNOSIS — S83.242A ACUTE MEDIAL MENISCUS TEAR, LEFT, INITIAL ENCOUNTER: Primary | ICD-10-CM

## 2024-09-11 DIAGNOSIS — M25.562 LEFT KNEE PAIN, UNSPECIFIED CHRONICITY: ICD-10-CM

## 2024-09-11 PROCEDURE — 99203 OFFICE O/P NEW LOW 30 MIN: CPT | Performed by: FAMILY MEDICINE

## 2024-09-11 NOTE — TELEPHONE ENCOUNTER
Patient has no showed last three scheduled visits with MCKENZIE Xavier. Not utilizing same day/MEL slots.   Added to cancellation list, attempted to schedule next avail which did not work for patient as he states forms need to be completed within 10 days so he can continue to be off work after injury  Advised would transfer to main schedulers to check other sites for access-

## 2024-09-11 NOTE — TELEPHONE ENCOUNTER
Reason for Call:  Appointment Request    Patient requesting this type of appt: Chronic Diease Management/Medication/Follow-Up    Requested provider: Rashad Xavier     Reason patient unable to be scheduled: Not within requested timeframe    When does patient want to be seen/preferred time: 3-7 days    Comments: Pt is requesting appointment for med check/bp as well as forms that are needing to be filled out for work.    Could we send this information to you in [email or web address] or would you prefer to receive a phone call?:   Patient would prefer a phone call   Okay to leave a detailed message?: Yes at Cell number on file:    Telephone Information:   Mobile [phone]       Call taken on 9/11/2024 at 4:29 PM by Aminta Cabrera
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
## 2024-10-15 ENCOUNTER — MYC MEDICAL ADVICE (OUTPATIENT)
Dept: ORTHOPEDICS | Facility: CLINIC | Age: 44
End: 2024-10-15
Payer: COMMERCIAL

## 2024-10-15 NOTE — TELEPHONE ENCOUNTER
Sent Mychart (1st Attempt) for the patient to call back and schedule the following:    Appointment type: New Knee  Provider: Dr Márquez  Return date: next avail  Additonal Notes: phone number went to mom, did not have patients phone number and mom could not provide it. Sent MyC with info

## 2024-10-16 ENCOUNTER — ANCILLARY PROCEDURE (OUTPATIENT)
Dept: MRI IMAGING | Facility: CLINIC | Age: 44
End: 2024-10-16
Attending: FAMILY MEDICINE
Payer: COMMERCIAL

## 2024-10-16 DIAGNOSIS — M25.562 LEFT KNEE PAIN, UNSPECIFIED CHRONICITY: ICD-10-CM

## 2024-10-16 DIAGNOSIS — S83.242A ACUTE MEDIAL MENISCUS TEAR, LEFT, INITIAL ENCOUNTER: ICD-10-CM

## 2024-10-16 DIAGNOSIS — M25.462 EFFUSION OF LEFT KNEE JOINT: ICD-10-CM

## 2024-10-16 PROCEDURE — 73721 MRI JNT OF LWR EXTRE W/O DYE: CPT | Mod: LT | Performed by: RADIOLOGY

## 2024-10-17 ENCOUNTER — TELEPHONE (OUTPATIENT)
Dept: ORTHOPEDICS | Facility: CLINIC | Age: 44
End: 2024-10-17
Payer: COMMERCIAL

## 2024-10-17 NOTE — TELEPHONE ENCOUNTER
Patient confirmed scheduled appointment:  Date: 10/28/24  Time: 9:40am  Visit type: NEW KNEE  Provider:   Location: [facility] [place]

## 2024-10-17 NOTE — TELEPHONE ENCOUNTER
DIAGNOSIS: LEFT KNEE   APPOINTMENT DATE: 10/21/2024   NOTES STATUS DETAILS   OFFICE NOTE from referring provider Internal 09/11/2024 - Sergio Escobar MD - [facility]   OFFICE NOTE from other specialist Internal 07/29/2024  [facility] [place]     DISCHARGE REPORT from the ER Internal 08/29/2024 - [facility] ED   (IMAGES & REPORTS) Internal

## 2024-10-21 ENCOUNTER — PRE VISIT (OUTPATIENT)
Dept: ORTHOPEDICS | Facility: CLINIC | Age: 44
End: 2024-10-21

## 2024-10-28 ENCOUNTER — TELEPHONE (OUTPATIENT)
Dept: ORTHOPEDICS | Facility: CLINIC | Age: 44
End: 2024-10-28

## 2024-10-28 ENCOUNTER — OFFICE VISIT (OUTPATIENT)
Dept: ORTHOPEDICS | Facility: CLINIC | Age: 44
End: 2024-10-28
Attending: FAMILY MEDICINE
Payer: COMMERCIAL

## 2024-10-28 VITALS — WEIGHT: 230 LBS | HEIGHT: 71 IN | BODY MASS INDEX: 32.2 KG/M2

## 2024-10-28 DIAGNOSIS — S83.242A ACUTE MEDIAL MENISCUS TEAR, LEFT, INITIAL ENCOUNTER: Primary | ICD-10-CM

## 2024-10-28 DIAGNOSIS — M25.562 LEFT KNEE PAIN, UNSPECIFIED CHRONICITY: ICD-10-CM

## 2024-10-28 DIAGNOSIS — M25.462 EFFUSION OF LEFT KNEE JOINT: ICD-10-CM

## 2024-10-28 DIAGNOSIS — S83.242A ACUTE MEDIAL MENISCUS TEAR, LEFT, INITIAL ENCOUNTER: ICD-10-CM

## 2024-10-28 PROCEDURE — 99214 OFFICE O/P EST MOD 30 MIN: CPT | Mod: 57 | Performed by: ORTHOPAEDIC SURGERY

## 2024-10-28 NOTE — PROGRESS NOTES
Patient seen and examined with the resident. I also personally reviewed the images and interpreted the imaging myself.     Assesment: Displaced tear of the medial meniscus left knee    Plan: Long discussion with the patient.  Reviewed the diagnosis potential treatment options.  Offered arthroscopic meniscectomy.    I discussed with the patient the risks, benefits, complications and techniques of surgery as well as the natural history of displaced meniscus tears and the alternative treatment options.    The risks include, but are not limited to the risk of death and risk of a myocardial infarction, risk of bleeding and a risk of infection, risk of nerve damage and a risk of muscle damage, stiffness, instability, continued pain or worsening pain and retear of the meniscus, need for further surgery, subsequent arthritis..    The patient was provided an opportunity to ask questions and these were answered.    I agree with history, physical and imaging as well as the assessment and plan as detailed by Dr. Mckenna.

## 2024-10-28 NOTE — TELEPHONE ENCOUNTER
Procedure: meniscectomy  Facility: [facility]  Length: 60 minutes  Anesthesia: Choice  Post-op appointments needed: 2 weeks provider only, 6 weeks with provider only.  Surgery packet/instructions given to patient?  Yes     Pre-Operative Teaching Flowsheet     Person(s) involved in teaching: Patient     Motivation Level:  Receptive (willing/able to accept information) and asks appropriate questions where applicable: Yes  Any cultural factors/Episcopalian beliefs that may influence understanding or compliance? No     Patient demonstrates understanding of the following:  Pre-operative planning, including the necessary appointments and preparation needed prior to surgery: Yes  Which situations necessitate calling provider and whom to contact: Yes  Pain management techniques pre and post op: Yes  How, and when, to access community resources: Yes    Who will drive and stay/ with patient after surgery: Friend  Pre-op exam   PT to be completed at          Additional Teaching Concerns Addressed:   Post-operative living arrangements and necessary adaptations to living environment.     Instructional Materials Used/Given: Yes, pre-op packet given including forms for Your surgery day, preparing for surgery, showering before surgery, Stop light tool introduced, Opioid pain medication guideline, pre-op physical form, and map  Patient expressed understanding of all forms given, questions were answered and will review in more detail at home.     Time spent with patient: 20 minutes.

## 2024-10-28 NOTE — PROGRESS NOTES
CHIEF CONCERN: Left knee pain    HISTORY:   Yimi is a 44-year-old gentleman who presents today with left knee pain.  He potentially injured the knee in early June 2024 when wrestling with his brother.  He remembers waking up the next morning with a swollen knee.  He wonders if he twisted it.  He now has clicking, swelling and pain in the knee that persists.  If he has a good night of sleep then the knee seems to behave.  If he does not sleep right then he has trouble with walking and quite a bit of medial knee pain.  He enjoys biking and walking around the legs and is able to do this if he has a good night sleep.  He points to the medial aspect of the knee as the most painful area.  The knee is especially painful with stairs.  He is not taking anything for pain.  He has not done physical therapy.  He has had no corticosteroid injections.  He has not had any previous knee surgeries, although he has had prior knee injuries that resolved in his youth.    He is currently unemployed.  He used to work in [place] but the company he worked for shutdown during COVID.  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    PAST MEDICAL HISTORY: (Reviewed with the patient and in the [facility] medical record)  Hypertension    PAST SURGICAL HISTORY: (Reviewed with the patient and in the [facility] medical record)  None    MEDICATIONS: (Reviewed with the patient and in the EPIC medical record)    Notable medications include: Losartan    ALLERGIES: (Reviewed with the patient and in the EPIC medical record)  Hydrocodone-acetaminophen      SOCIAL HISTORY: (Reviewed with the patient and in the medical record)  --Tobacco: Current use, 8 to 10 cigarettes/day  --Occupation: Unemployed.  Used to work as a  before the company shutdown.  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  --Avocation/Sport: Biking, [place], playing with his child    FAMILY HISTORY: (Reviewed with the  patient and in the medical record)  -- No family history of bleeding, clotting, or difficulty with anesthesia    REVIEW OF SYSTEMS: (Reviewed with the patient and on the health intake form)  -- A comprehensive 10 point review of systems was conducted and is negative except as noted in the HPI    EXAM:     General: Awake, Alert and Oriented, No acute Distress. Articulate and Interactive    Body mass index is 32.54 kg/m .    Left lower extremity :  Skin is Warm and Well perfused, no suggestion of infection  Gait is nonantalgic.  Gains the exam table with little effort  Left knee motion is -1 to 125, less than contralateral  Positive deep squat, positive Thessaly, positive Zeenat, positive medial joint line tenderness.  No lateral joint line tenderness  Lachman's Ia, symmetric to contralateral  Stable to varus and valgus stress at 30 degrees  EHL/FHL/TA/GS 5/5  Sensation intact L3-S1  2+ Dorsalis Pedis Pulse    IMAGING:    Radiographs of the left knee from 8/29/2024 were independently reviewed by me and findings were discussed with the patient today. The imaging demonstrates no acute fractures.  Mild medial joint space narrowing.    MRI of the left knee from 10/16/2024 were independently reviewed by me and findings were discussed with the patient today. The imaging demonstrates bilateral meniscus tears and medial discus is flipped.    ASSESSMENT:  Left medial meniscus tear, displaced  Left knee medial tibiofemoral compartment arthritis    PLAN:  Discussed surgical versus nonsurgical management.  Due to the displaced nature of his meniscus tear, surgery is indicated.  Surgery involves cleaning out the meniscus.  This is very likely not repairable.  We did discuss conservative options including injections, physical therapy, bracing, OTC pain medication management.  At this time, it is reasonable, and he would like to, proceed with surgery.

## 2024-10-28 NOTE — LETTER
10/28/2024      Ez Mckeon  [address]  [facility] [address]      Dear Colleague,    Thank you for referring your patient, Ez Mckeon, to the [facility] [place]. Please see a copy of my visit note below.    CHIEF CONCERN: Left knee pain    HISTORY:   Yimi is a 44-year-old gentleman who presents today with left knee pain.  He potentially injured the knee in early June 2024 when wrestling with his brother.  He remembers waking up the next morning with a swollen knee.  He wonders if he twisted it.  He now has clicking, swelling and pain in the knee that persists.  If he has a good night of sleep then the knee seems to behave.  If he does not sleep right then he has trouble with walking and quite a bit of medial knee pain.  He enjoys biking and walking around the legs and is able to do this if he has a good night sleep.  He points to the medial aspect of the knee as the most painful area.  The knee is especially painful with stairs.  He is not taking anything for pain.  He has not done physical therapy.  He has had no corticosteroid injections.  He has not had any previous knee surgeries, although he has had prior knee injuries that resolved in his youth.    He is currently unemployed.  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    PAST MEDICAL HISTORY: (Reviewed with the patient and in the [facility] medical record)  Hypertension    PAST SURGICAL HISTORY: (Reviewed with the patient and in the [facility] medical record)  None    MEDICATIONS: (Reviewed with the patient and in the [facility] medical record)    Notable medications include: Losartan    ALLERGIES: (Reviewed with the patient and in the EPIC medical record)  Hydrocodone-acetaminophen      SOCIAL HISTORY: (Reviewed with the patient and in the medical record)  --Tobacco: Current use, 8 to  10 cigarettes/day  --Occupation: Unemployed.  Used to work as a  before the company shutdown.  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  --Avocation/Sport: Biking, walking [place], playing with his child    FAMILY HISTORY: (Reviewed with the patient and in the medical record)  -- No family history of bleeding, clotting, or difficulty with anesthesia    REVIEW OF SYSTEMS: (Reviewed with the patient and on the health intake form)  -- A comprehensive 10 point review of systems was conducted and is negative except as noted in the HPI    EXAM:     General: Awake, Alert and Oriented, No acute Distress. Articulate and Interactive    Body mass index is 32.54 kg/m .    Left lower extremity :  Skin is Warm and Well perfused, no suggestion of infection  Gait is nonantalgic.  Gains the exam table with little effort  Left knee motion is -1 to 125, less than contralateral  Positive deep squat, positive Thessaly, positive Zeenat, positive medial joint line tenderness.  No lateral joint line tenderness  Lachman's Ia, symmetric to contralateral  Stable to varus and valgus stress at 30 degrees  EHL/FHL/TA/GS 5/5  Sensation intact L3-S1  2+ Dorsalis Pedis Pulse    IMAGING:    Radiographs of the left knee from 8/29/2024 were independently reviewed by me and findings were discussed with the patient today. The imaging demonstrates no acute fractures.  Mild medial joint space narrowing.    MRI of the left knee from 10/16/2024 were independently reviewed by me and findings were discussed with the patient today. The imaging demonstrates bilateral meniscus tears and medial discus is flipped.    ASSESSMENT:  Left medial meniscus tear, displaced  Left knee medial tibiofemoral compartment arthritis    PLAN:  Discussed surgical versus nonsurgical management.  Due to the displaced nature of his meniscus tear, surgery is indicated.  Surgery involves cleaning out the meniscus.  This is very likely not repairable.  We did discuss conservative  options including injections, physical therapy, bracing, OTC pain medication management.  At this time, it is reasonable, and he would like to, proceed with surgery.      Patient seen and examined with the resident. I also personally reviewed the images and interpreted the imaging myself.     Assesment: Displaced tear of the medial meniscus left knee    Plan: Long discussion with the patient.  Reviewed the diagnosis potential treatment options.  Offered arthroscopic meniscectomy.    I discussed with the patient the risks, benefits, complications and techniques of surgery as well as the natural history of displaced meniscus tears and the alternative treatment options.    The risks include, but are not limited to the risk of death and risk of a myocardial infarction, risk of bleeding and a risk of infection, risk of nerve damage and a risk of muscle damage, stiffness, instability, continued pain or worsening pain and retear of the meniscus, need for further surgery, subsequent arthritis..    The patient was provided an opportunity to ask questions and these were answered.    I agree with history, physical and imaging as well as the assessment and plan as detailed by Dr. Mckenna.       Again, thank you for allowing me to participate in the care of your patient.        Sincerely,        José Márquez MD

## 2024-10-29 PROBLEM — S83.242A ACUTE MEDIAL MENISCUS TEAR, LEFT, INITIAL ENCOUNTER: Status: ACTIVE | Noted: 2024-10-28

## 2024-10-29 NOTE — TELEPHONE ENCOUNTER
Patient is scheduled for surgery with Dr. Márquez    Spoke with: Patient    Date of Surgery: 11/14/24    Location: [facility]    Informed patient they will need an adult : Yes    Post ops: 11/21 with PA, 12/30 with MD    Pre op with Provider: Complete    H&P: Will scheduled with his PCP    Additional imaging/appointments: N/A    Surgery packet: Received in clinic     Additional comments: N/A        Herlinda Dee MA on 10/29/2024 at 10:42 AM

## 2024-11-11 ENCOUNTER — OFFICE VISIT (OUTPATIENT)
Dept: FAMILY MEDICINE | Facility: CLINIC | Age: 44
End: 2024-11-11
Payer: COMMERCIAL

## 2024-11-11 VITALS
BODY MASS INDEX: 32.93 KG/M2 | DIASTOLIC BLOOD PRESSURE: 94 MMHG | OXYGEN SATURATION: 96 % | RESPIRATION RATE: 16 BRPM | WEIGHT: 230 LBS | HEART RATE: 95 BPM | TEMPERATURE: 98 F | HEIGHT: 70 IN | SYSTOLIC BLOOD PRESSURE: 162 MMHG

## 2024-11-11 DIAGNOSIS — Z01.818 PREOP GENERAL PHYSICAL EXAM: Primary | ICD-10-CM

## 2024-11-11 DIAGNOSIS — S83.242A ACUTE MEDIAL MENISCUS TEAR, LEFT, INITIAL ENCOUNTER: ICD-10-CM

## 2024-11-11 DIAGNOSIS — F41.1 GAD (GENERALIZED ANXIETY DISORDER): ICD-10-CM

## 2024-11-11 DIAGNOSIS — I10 BENIGN HYPERTENSION: ICD-10-CM

## 2024-11-11 LAB
ANION GAP SERPL CALCULATED.3IONS-SCNC: 14 MMOL/L (ref 7–15)
BUN SERPL-MCNC: 7.5 MG/DL (ref 6–20)
CALCIUM SERPL-MCNC: 8.6 MG/DL (ref 8.8–10.4)
CHLORIDE SERPL-SCNC: 106 MMOL/L (ref 98–107)
CREAT SERPL-MCNC: 0.65 MG/DL (ref 0.67–1.17)
EGFRCR SERPLBLD CKD-EPI 2021: >90 ML/MIN/1.73M2
ERYTHROCYTE [DISTWIDTH] IN BLOOD BY AUTOMATED COUNT: 12.5 % (ref 10–15)
GLUCOSE SERPL-MCNC: 105 MG/DL (ref 70–99)
HCO3 SERPL-SCNC: 23 MMOL/L (ref 22–29)
HCT VFR BLD AUTO: 40.9 % (ref 40–53)
HGB BLD-MCNC: 13.5 G/DL (ref 13.3–17.7)
MCH RBC QN AUTO: 32.8 PG (ref 26.5–33)
MCHC RBC AUTO-ENTMCNC: 33 G/DL (ref 31.5–36.5)
MCV RBC AUTO: 100 FL (ref 78–100)
PLATELET # BLD AUTO: 196 10E3/UL (ref 150–450)
POTASSIUM SERPL-SCNC: 4.1 MMOL/L (ref 3.4–5.3)
RBC # BLD AUTO: 4.11 10E6/UL (ref 4.4–5.9)
SODIUM SERPL-SCNC: 143 MMOL/L (ref 135–145)
WBC # BLD AUTO: 7.5 10E3/UL (ref 4–11)

## 2024-11-11 PROCEDURE — 99214 OFFICE O/P EST MOD 30 MIN: CPT

## 2024-11-11 PROCEDURE — 80048 BASIC METABOLIC PNL TOTAL CA: CPT

## 2024-11-11 PROCEDURE — 36415 COLL VENOUS BLD VENIPUNCTURE: CPT

## 2024-11-11 PROCEDURE — 85027 COMPLETE CBC AUTOMATED: CPT

## 2024-11-11 RX ORDER — LOSARTAN POTASSIUM 50 MG/1
50 TABLET ORAL DAILY
Qty: 90 TABLET | Refills: 0 | Status: SHIPPED | OUTPATIENT
Start: 2024-11-11

## 2024-11-11 ASSESSMENT — PAIN SCALES - GENERAL: PAINLEVEL_OUTOF10: SEVERE PAIN (7)

## 2024-11-11 NOTE — PATIENT INSTRUCTIONS
How to Take Your Medication Before Surgery  Preoperative Medication Instructions   Antiplatelet or Anticoagulation Medication Instructions   - Patient is on no antiplatelet or anticoagulation medications.    Additional Medication Instructions  Take all scheduled medications on the day of surgery EXCEPT for modifications listed below:  Losartan hold on day of surgery  Hydroxyzine hold on day of surgery  Ibuprofen hold 1 day prior to surgery       Patient Education   Preparing for Your Surgery  For Adults  Getting started  In most cases, a nurse will call to review your health history and instructions. They will give you an arrival time based on your scheduled surgery time. Please be ready to share:  Your doctor's clinic name and phone number  Your medical, surgical, and anesthesia history  A list of allergies and sensitivities  A list of medicines, including herbal treatments and over-the-counter drugs  Whether the patient has a legal guardian (ask how to send us the papers in advance)  Note: You may not receive a call if you were seen at our PAC (Preoperative Assessment Center).  Please tell us if you're pregnant--or if there's any chance you might be pregnant. Some surgeries may injure a fetus (unborn baby), so they require a pregnancy test. Surgeries that are safe for a fetus don't always need a test, and you can choose whether to have one.   Preparing for surgery  Within 10 to 30 days of surgery: Have a pre-op exam (sometimes called an H&P, or History and Physical). This can be done at a clinic or pre-operative center.  If you're having a , you may not need this exam. Talk to your care team.  At your pre-op exam, talk to your care team about all medicines you take. (This includes CBD oil and any drugs, such as THC, marijuana, and other forms of cannabis.) If you need to stop any medicine before surgery, ask when to start taking it again.  This is for your safety. Many medicines and drugs can make you  bleed too much during surgery. Some change how well surgery (anesthesia) drugs work.  Call your insurance company to let them know you're having surgery. (If you don't have insurance, call [phone].)  Call your clinic if there's any change in your health. This includes a scrape or scratch near the surgery site, or any signs of a cold (sore throat, runny nose, cough, rash, fever).  Eating and drinking guidelines  For your safety: Unless your surgeon tells you otherwise, follow the guidelines below.  Eat and drink as normal until 8 hours before you arrive for surgery. After that, no food or milk. You can spit out gum when you arrive.  Drink clear liquids until 2 hours before you arrive. These are liquids you can see through, like water, Gatorade, and Propel Water. They also include plain black coffee and tea (no cream or milk).  No alcohol for 24 hours before you arrive. The night before surgery, stop any drinks that contain THC.  If your care team tells you to take medicine on the morning of surgery, it's okay to take it with a sip of water. No other medicines or drugs are allowed (including CBD oil)--follow your care team's instructions.  If you have questions the day of surgery, call your hospital or surgery center.   Preventing infection  Shower or bathe the night before and the morning of surgery. Follow the instructions your clinic gave you. (If no instructions, use regular soap.)  Don't shave or clip hair near your surgery site. We'll remove the hair if needed.  Don't smoke or vape the morning of surgery. No chewing tobacco for 6 hours before you arrive. A nicotine patch is okay. You may spit out nicotine gum when you arrive.  For some surgeries, the surgeon will tell you to fully quit smoking and nicotine.  We will make every effort to keep you safe from infection. We will:  Clean our hands often with soap and water (or an alcohol-based hand rub).  Clean the skin at your surgery site with a special soap  that kills germs.  Give you a special gown to keep you warm. (Cold raises the risk of infection.)  Wear hair covers, masks, gowns, and gloves during surgery.  Give antibiotic medicine, if prescribed. Not all surgeries need this medicine.  What to bring on the day of surgery  Photo ID and insurance card  Copy of your health care directive, if you have one  Glasses and hearing aids (bring cases)  You can't wear contacts during surgery  Inhaler and eye drops, if you use them (tell us about these when you arrive)  CPAP machine or breathing device, if you use them  A few personal items, if spending the night  If you have . . .  A pacemaker, ICD (cardiac defibrillator), or other implant: Bring the ID card.  An implanted stimulator: Bring the remote control.  A legal guardian: Bring a copy of the certified (court-stamped) guardianship papers.  Please remove any jewelry, including body piercings. Leave jewelry and other valuables at home.  If you're going home the day of surgery  You must have a responsible adult drive you home. They should stay with you overnight as well.  If you don't have someone to stay with you, and you aren't safe to go home alone, we may keep you overnight. Insurance often won't pay for this.  After surgery  If it's hard to control your pain or you need more pain medicine, please call your surgeon's office.  Questions?   If you have any questions for your care team, list them here:   ____________________________________________________________________________________________________________________________________________________________________________________________________________________________________________________________  For informational purposes only. Not to replace the advice of your health care provider. Copyright   2003, 2019 [facility]. All rights reserved. Clinically reviewed by Harrison Sears MD. [employer] 820291 - REV 08/24.

## 2024-11-11 NOTE — PROGRESS NOTES
Preoperative Evaluation  [facility]  [address]  [address]  [place] [address]  Phone: [phone]  Fax: [phone]  Primary Provider: Physician No Ref-Primary  Pre-op Performing Provider: TONY Harrison CNP  Nov 11, 2024 11/8/2024   Surgical Information   What procedure is being done? Preop physical    Facility or Hospital where procedure/surgery will be performed: [facility]    Who is doing the procedure / surgery? Dr feliciano    Date of surgery / procedure: Nov 14    Time of surgery / procedure: 9:00    Where do you plan to recover after surgery? at home with family        Patient-reported     Fax number for surgical facility: Note does not need to be faxed, will be available electronically in Epic.    Assessment & Plan     The proposed surgical procedure is considered INTERMEDIATE risk.    Preop general physical exam  Acute medial meniscus tear, left, initial encounter  Suboptimal blood pressure control today as he has not taken his prescribed losartan as below, will await MA BP check appt on Wed to approve for surgery.  *addendum* patient did not show up for scheduled BP check today -- sent message to surgeon regarding suboptimal BP control at pre op visit to make sure aware, will sign off on approval for surgery given asymptomatic and restarted his losartan (hold on day of surgery)  Hold ibuprofen 1 day prior to surgery.  - CBC with platelets  - Basic metabolic panel  (Ca, Cl, CO2, Creat, Gluc, K, Na, BUN)    Benign hypertension  Has been off of his losartan for about 2 months, has been out of medication. BP elevated in clinic x 3 readings today. No angina, dizziness, headaches or other symptoms.   Doesn't check blood pressure at home regularly.   Refilled his losartan prescription to his pharmacy and advised he start this immediately, then return on Wed 11/13 for MA BP check and if <160/90 at that time and continues to be without symptoms will  approve for surgery and have him follow-up closely with PCP for ongoing monitoring.   Will need to hold losartan on day of surgery   - losartan (COZAAR) 50 MG tablet  Dispense: 90 tablet; Refill: 0    MINDI (generalized anxiety disorder)  Hold hydroxyzine on day of surgery          - No identified additional risk factors other than previously addressed    Preoperative Medication Instructions  Antiplatelet or Anticoagulation Medication Instructions   - Patient is on no antiplatelet or anticoagulation medications.    Additional Medication Instructions  Take all scheduled medications on the day of surgery EXCEPT for modifications listed below:  Losartan hold on day of surgery  Hydroxyzine hold on day of surgery  Ibuprofen hold 1 day prior to surgery    Recommendation  pending review of diagnostic evaluation-- BP recheck scheduled 11/13  Addendum* patient did not show up for scheduled BP check today -- sent message to surgeon regarding suboptimal BP control at pre op visit to make sure aware, will sign off on approval for surgery given asymptomatic and restarted his losartan (hold on day of surgery)    Jayda Hui is a 44 year old, presenting for the following:  Pre-Op Exam          11/11/2024     9:23 AM   Additional Questions   Roomed by Zuleyka Wilks         11/11/2024   Forms   Any forms needing to be completed Yes        HPI related to upcoming procedure: left knee- medial meniscus tear         11/8/2024   Pre-Op Questionnaire   Have you ever had a heart attack or stroke? No    Have you ever had surgery on your heart or blood vessels, such as a stent placement, a coronary artery bypass, or surgery on an artery in your head, neck, heart, or legs? No    Do you have chest pain with activity? No    Do you have a history of heart failure? No    Do you currently have a cold, bronchitis or symptoms of other infection? No    Do you have a cough, shortness of breath, or wheezing? No    Do you or anyone in your family  have previous history of blood clots? No    Do you or does anyone in your family have a serious bleeding problem such as prolonged bleeding following surgeries or cuts? No    Have you ever had problems with anemia or been told to take iron pills? No    Have you had any abnormal blood loss such as black, tarry or bloody stools? No    Have you ever had a blood transfusion? No    Are you willing to have a blood transfusion if it is medically needed before, during, or after your surgery? Yes     Have you or any of your relatives ever had problems with anesthesia? No    Do you have sleep apnea, excessive snoring or daytime drowsiness? No    Do you have any artifical heart valves or other implanted medical devices like a pacemaker, defibrillator, or continuous glucose monitor? No    Do you have artificial joints? No    Are you allergic to latex? No        Patient-reported     Health Care Directive  Patient does not have a Health Care Directive: Discussed advance care planning with patient; however, patient declined at this time.    Preoperative Review of    reviewed - no record of controlled substances prescribed. Since July 2024.        Patient Active Problem List    Diagnosis Date Noted    Acute medial meniscus tear, left, initial encounter 10/28/2024     Priority: Medium    Benign hypertension 01/23/2024     Priority: Medium    Suicide attempt by hanging (H) 08/12/2019     Priority: Medium      Past Medical History:   Diagnosis Date    Hypertension      History reviewed. No pertinent surgical history.  Current Outpatient Medications   Medication Sig Dispense Refill    hydrOXYzine HCl (ATARAX) 25 MG tablet Take 1 tablet (25 mg) by mouth 3 times daily as needed for itching 30 tablet 1    ibuprofen (ADVIL/MOTRIN) 600 MG tablet Take 1 tablet (600 mg) by mouth every 6 hours as needed for moderate pain. 30 tablet 0    losartan (COZAAR) 50 MG tablet Take 1 tablet (50 mg) by mouth daily. 90 tablet 0       Allergies  "  Allergen Reactions    Hydrocodone-Acetaminophen Itching        Social History     Tobacco Use    Smoking status: Every Day     Current packs/day: 1.50     Types: Cigarettes    Smokeless tobacco: Never   Substance Use Topics    Alcohol use: Yes     Comment: occasionally       History   Drug Use    Types: Marijuana     Comment: occasional marijuana use             Objective    BP (!) 162/94   Pulse 95   Temp 98  F (36.7  C) (Temporal)   Resp 16   Ht 1.79 m (5' 10.47\")   Wt 104.3 kg (230 lb)   SpO2 96%   BMI 32.56 kg/m     Estimated body mass index is 32.56 kg/m  as calculated from the following:    Height as of this encounter: 1.79 m (5' 10.47\").    Weight as of this encounter: 104.3 kg (230 lb).  Physical Exam  GENERAL: alert and no distress  EYES: Eyes grossly normal to inspection, PERRL and conjunctivae and sclerae normal  HENT: ear canals and TM's normal, nose and mouth without ulcers or lesions  RESP: lungs clear to auscultation - no rales, rhonchi or wheezes  CV: regular rate and rhythm, normal S1 S2, no S3 or S4, no murmur, click or rub, no peripheral edema  ABDOMEN: soft, nontender, no hepatosplenomegaly, no masses and bowel sounds normal  MS: no gross musculoskeletal defects noted, no edema  NEURO: Normal strength and tone, mentation intact and speech normal  PSYCH: mentation appears normal, affect normal/bright    No results for input(s): \"HGB\", \"PLT\", \"INR\", \"NA\", \"POTASSIUM\", \"CR\", \"A1C\" in the last 8760 hours.     Diagnostics  Labs pending at this time.  Results will be reviewed when available.   No EKG required, no history of coronary heart disease, significant arrhythmia, peripheral arterial disease or other structural heart disease.    Revised Cardiac Risk Index (RCRI)  The patient has the following serious cardiovascular risks for perioperative complications:   - No serious cardiac risks = 0 points     RCRI Interpretation: 0 points: Class I (very low risk - 0.4% complication rate)     "     Signed Electronically by: TONY Harrison CNP  A copy of this evaluation report is provided to the requesting physician.

## 2024-11-13 ENCOUNTER — TELEPHONE (OUTPATIENT)
Dept: FAMILY MEDICINE | Facility: CLINIC | Age: 44
End: 2024-11-13

## 2024-11-13 ENCOUNTER — ANESTHESIA EVENT (OUTPATIENT)
Dept: SURGERY | Facility: AMBULATORY SURGERY CENTER | Age: 44
End: 2024-11-13
Payer: COMMERCIAL

## 2024-11-13 RX ORDER — OXYCODONE HYDROCHLORIDE 5 MG/1
5 TABLET ORAL
Status: CANCELLED | OUTPATIENT
Start: 2024-11-13

## 2024-11-13 RX ORDER — DEXAMETHASONE SODIUM PHOSPHATE 10 MG/ML
4 INJECTION, SOLUTION INTRAMUSCULAR; INTRAVENOUS
Status: CANCELLED | OUTPATIENT
Start: 2024-11-13

## 2024-11-13 RX ORDER — OXYCODONE HYDROCHLORIDE 5 MG/1
10 TABLET ORAL
Status: CANCELLED | OUTPATIENT
Start: 2024-11-13

## 2024-11-13 RX ORDER — ONDANSETRON 2 MG/ML
4 INJECTION INTRAMUSCULAR; INTRAVENOUS EVERY 30 MIN PRN
Status: CANCELLED | OUTPATIENT
Start: 2024-11-13

## 2024-11-13 RX ORDER — ONDANSETRON 4 MG/1
4 TABLET, ORALLY DISINTEGRATING ORAL EVERY 30 MIN PRN
Status: CANCELLED | OUTPATIENT
Start: 2024-11-13

## 2024-11-13 RX ORDER — NALOXONE HYDROCHLORIDE 0.4 MG/ML
0.1 INJECTION, SOLUTION INTRAMUSCULAR; INTRAVENOUS; SUBCUTANEOUS
Status: CANCELLED | OUTPATIENT
Start: 2024-11-13

## 2024-11-13 NOTE — TELEPHONE ENCOUNTER
Hello,  I saw patient for pre-op earlier this week and had restarted him on his losartan that he had been off of for two months- during pre op BP was elevated due to this (lowest reading 162/94)- asymptomatic.   I had asked him to come in for medical assistant BP recheck today which he did not show up for.     If surgeon ok with proceeding with surgery as scheduled based on this, will sign off on pre-op -- just want to make sure everyone is aware of suboptimal BP control     Helen Ha, DNP

## 2024-11-13 NOTE — ANESTHESIA PREPROCEDURE EVALUATION
"Anesthesia Pre-Procedure Evaluation    Patient: Ez Mckeon   MRN: [id number] : 1980        Procedure : Procedure(s):  left knee examination under anesthesia, knee arthroscopy, meniscectomy          Past Medical History:   Diagnosis Date    Hypertension       No past surgical history on file.   Allergies   Allergen Reactions    Hydrocodone-Acetaminophen Itching      Social History     Tobacco Use    Smoking status: Every Day     Current packs/day: 1.50     Types: Cigarettes    Smokeless tobacco: Never   Substance Use Topics    Alcohol use: Yes     Comment: occasionally      Wt Readings from Last 1 Encounters:   24 104.3 kg (230 lb)           Physical Exam    Airway        Mallampati: I   TM distance: > 3 FB   Neck ROM: full   Mouth opening: > 3 cm    Respiratory Devices and Support         Dental       (+) Minor Abnormalities - some fillings, tiny chips      Cardiovascular   cardiovascular exam normal          Pulmonary   pulmonary exam normal                OUTSIDE LABS:  CBC:   Lab Results   Component Value Date    WBC 7.5 2024    WBC 7.0 2019    HGB 13.5 2024    HGB 13.8 2019    HCT 40.9 2024    HCT 41.5 2019     2024     2019     BMP:   Lab Results   Component Value Date     2024     2023    POTASSIUM 4.1 2024    POTASSIUM 4.7 2023    CHLORIDE 106 2024    CHLORIDE 104 2023    CO2 23 2024    CO2 31 (H) 2023    BUN 7.5 2024    BUN 13.0 2023    CR 0.65 (L) 2024    CR 0.96 2023     (H) 2024    GLC 96 2023     COAGS: No results found for: \"PTT\", \"INR\", \"FIBR\"  POC: No results found for: \"BGM\", \"HCG\", \"HCGS\"  HEPATIC:   Lab Results   Component Value Date    ALBUMIN 4.2 2023    PROTTOTAL 7.1 2023    ALT 48 2023    AST 49 (H) 2023    ALKPHOS 66 2023    BILITOTAL 0.3 2023    ADRIANE 37 2019     OTHER: " "  Lab Results   Component Value Date    GEORGE 8.6 (L) 11/11/2024    TSH 0.75 08/13/2019       Anesthesia Plan    ASA Status:  2    NPO Status:  NPO Appropriate    Anesthesia Type: General.     - Airway: LMA   Induction: Intravenous, Propofol.   Maintenance: Balanced.        Consents    Anesthesia Plan(s) and associated risks, benefits, and realistic alternatives discussed. Questions answered and patient/representative(s) expressed understanding.     - Discussed: Risks, Benefits and Alternatives for BOTH SEDATION and the PROCEDURE were discussed     - Discussed with:  Patient      - Extended Intubation/Ventilatory Support Discussed: No.      - Patient is DNR/DNI Status: No     Use of blood products discussed: No .     Postoperative Care    Pain management: IV analgesics, Oral pain medications, Multi-modal analgesia.   PONV prophylaxis: Dexamethasone or Solumedrol, Ondansetron (or other 5HT-3), Background Propofol Infusion     Comments:               Zacarias Sullivan MD    I have reviewed the pertinent notes and labs in the chart from the past 30 days and (re)examined the patient.  Any updates or changes from those notes are reflected in this note.               # Hypertension: Noted on problem list           # Obesity: Estimated body mass index is 32.56 kg/m  as calculated from the following:    Height as of 11/11/24: 1.79 m (5' 10.47\").    Weight as of 11/11/24: 104.3 kg (230 lb).             "

## 2024-11-14 ENCOUNTER — HOSPITAL ENCOUNTER (OUTPATIENT)
Facility: AMBULATORY SURGERY CENTER | Age: 44
Discharge: HOME | End: 2024-11-14
Attending: ORTHOPAEDIC SURGERY
Payer: COMMERCIAL

## 2024-11-14 ENCOUNTER — ANESTHESIA (OUTPATIENT)
Dept: SURGERY | Facility: AMBULATORY SURGERY CENTER | Age: 44
End: 2024-11-14
Payer: COMMERCIAL

## 2024-11-14 VITALS
WEIGHT: 230 LBS | RESPIRATION RATE: 16 BRPM | SYSTOLIC BLOOD PRESSURE: 168 MMHG | HEART RATE: 69 BPM | DIASTOLIC BLOOD PRESSURE: 122 MMHG | HEIGHT: 70 IN | TEMPERATURE: 97.3 F | OXYGEN SATURATION: 97 % | BODY MASS INDEX: 32.93 KG/M2

## 2024-11-14 DIAGNOSIS — S83.242A ACUTE MEDIAL MENISCUS TEAR, LEFT, INITIAL ENCOUNTER: ICD-10-CM

## 2024-11-14 RX ORDER — ACETAMINOPHEN 325 MG/1
650 TABLET ORAL EVERY 4 HOURS PRN
Qty: 50 TABLET | Refills: 0 | Status: SHIPPED | OUTPATIENT
Start: 2024-11-14

## 2024-11-14 RX ORDER — PROPOFOL 10 MG/ML
INJECTION, EMULSION INTRAVENOUS PRN
Status: DISCONTINUED | OUTPATIENT
Start: 2024-11-14 | End: 2024-11-14

## 2024-11-14 RX ORDER — FENTANYL CITRATE 50 UG/ML
50 INJECTION, SOLUTION INTRAMUSCULAR; INTRAVENOUS EVERY 5 MIN PRN
Status: DISCONTINUED | OUTPATIENT
Start: 2024-11-14 | End: 2024-11-15 | Stop reason: HOSPADM

## 2024-11-14 RX ORDER — AMOXICILLIN 250 MG
1-2 CAPSULE ORAL 2 TIMES DAILY
Qty: 30 TABLET | Refills: 0 | Status: SHIPPED | OUTPATIENT
Start: 2024-11-14

## 2024-11-14 RX ORDER — CEFAZOLIN SODIUM 2 G/50ML
2 SOLUTION INTRAVENOUS
Status: COMPLETED | OUTPATIENT
Start: 2024-11-14 | End: 2024-11-14

## 2024-11-14 RX ORDER — ONDANSETRON 2 MG/ML
INJECTION INTRAMUSCULAR; INTRAVENOUS PRN
Status: DISCONTINUED | OUTPATIENT
Start: 2024-11-14 | End: 2024-11-14

## 2024-11-14 RX ORDER — DEXAMETHASONE SODIUM PHOSPHATE 10 MG/ML
4 INJECTION, SOLUTION INTRAMUSCULAR; INTRAVENOUS
Status: DISCONTINUED | OUTPATIENT
Start: 2024-11-14 | End: 2024-11-15 | Stop reason: HOSPADM

## 2024-11-14 RX ORDER — KETOROLAC TROMETHAMINE 30 MG/ML
INJECTION, SOLUTION INTRAMUSCULAR; INTRAVENOUS PRN
Status: DISCONTINUED | OUTPATIENT
Start: 2024-11-14 | End: 2024-11-14

## 2024-11-14 RX ORDER — ONDANSETRON 2 MG/ML
4 INJECTION INTRAMUSCULAR; INTRAVENOUS EVERY 30 MIN PRN
Status: DISCONTINUED | OUTPATIENT
Start: 2024-11-14 | End: 2024-11-15 | Stop reason: HOSPADM

## 2024-11-14 RX ORDER — ONDANSETRON 4 MG/1
4 TABLET, ORALLY DISINTEGRATING ORAL
Status: DISCONTINUED | OUTPATIENT
Start: 2024-11-14 | End: 2024-11-15 | Stop reason: HOSPADM

## 2024-11-14 RX ORDER — OXYCODONE HYDROCHLORIDE 5 MG/1
5-10 TABLET ORAL EVERY 4 HOURS PRN
Qty: 10 TABLET | Refills: 0 | Status: SHIPPED | OUTPATIENT
Start: 2024-11-14

## 2024-11-14 RX ORDER — HYDROXYZINE HYDROCHLORIDE 25 MG/1
25 TABLET, FILM COATED ORAL 3 TIMES DAILY PRN
Qty: 15 TABLET | Refills: 0 | Status: SHIPPED | OUTPATIENT
Start: 2024-11-14

## 2024-11-14 RX ORDER — ONDANSETRON 4 MG/1
4 TABLET, ORALLY DISINTEGRATING ORAL EVERY 30 MIN PRN
Status: DISCONTINUED | OUTPATIENT
Start: 2024-11-14 | End: 2024-11-15 | Stop reason: HOSPADM

## 2024-11-14 RX ORDER — KETOROLAC TROMETHAMINE 30 MG/ML
15 INJECTION, SOLUTION INTRAMUSCULAR; INTRAVENOUS
Status: DISCONTINUED | OUTPATIENT
Start: 2024-11-14 | End: 2024-11-15 | Stop reason: HOSPADM

## 2024-11-14 RX ORDER — SODIUM CHLORIDE, SODIUM LACTATE, POTASSIUM CHLORIDE, CALCIUM CHLORIDE 600; 310; 30; 20 MG/100ML; MG/100ML; MG/100ML; MG/100ML
INJECTION, SOLUTION INTRAVENOUS CONTINUOUS PRN
Status: DISCONTINUED | OUTPATIENT
Start: 2024-11-14 | End: 2024-11-14

## 2024-11-14 RX ORDER — LIDOCAINE HYDROCHLORIDE 20 MG/ML
INJECTION, SOLUTION INFILTRATION; PERINEURAL PRN
Status: DISCONTINUED | OUTPATIENT
Start: 2024-11-14 | End: 2024-11-14

## 2024-11-14 RX ORDER — NALOXONE HYDROCHLORIDE 0.4 MG/ML
0.1 INJECTION, SOLUTION INTRAMUSCULAR; INTRAVENOUS; SUBCUTANEOUS
Status: DISCONTINUED | OUTPATIENT
Start: 2024-11-14 | End: 2024-11-15 | Stop reason: HOSPADM

## 2024-11-14 RX ORDER — PROPOFOL 10 MG/ML
INJECTION, EMULSION INTRAVENOUS CONTINUOUS PRN
Status: DISCONTINUED | OUTPATIENT
Start: 2024-11-14 | End: 2024-11-14

## 2024-11-14 RX ORDER — ACETAMINOPHEN 325 MG/1
650 TABLET ORAL
Status: DISCONTINUED | OUTPATIENT
Start: 2024-11-14 | End: 2024-11-15 | Stop reason: HOSPADM

## 2024-11-14 RX ORDER — FENTANYL CITRATE 50 UG/ML
25 INJECTION, SOLUTION INTRAMUSCULAR; INTRAVENOUS EVERY 5 MIN PRN
Status: DISCONTINUED | OUTPATIENT
Start: 2024-11-14 | End: 2024-11-15 | Stop reason: HOSPADM

## 2024-11-14 RX ORDER — DIMENHYDRINATE 50 MG/ML
25 INJECTION, SOLUTION INTRAMUSCULAR; INTRAVENOUS
Status: DISCONTINUED | OUTPATIENT
Start: 2024-11-14 | End: 2024-11-15 | Stop reason: HOSPADM

## 2024-11-14 RX ORDER — FENTANYL CITRATE 50 UG/ML
INJECTION, SOLUTION INTRAMUSCULAR; INTRAVENOUS PRN
Status: DISCONTINUED | OUTPATIENT
Start: 2024-11-14 | End: 2024-11-14

## 2024-11-14 RX ORDER — OXYCODONE HYDROCHLORIDE 5 MG/1
5 TABLET ORAL
Status: COMPLETED | OUTPATIENT
Start: 2024-11-14 | End: 2024-11-14

## 2024-11-14 RX ORDER — ONDANSETRON 4 MG/1
4 TABLET, ORALLY DISINTEGRATING ORAL EVERY 8 HOURS PRN
Qty: 4 TABLET | Refills: 0 | Status: SHIPPED | OUTPATIENT
Start: 2024-11-14

## 2024-11-14 RX ORDER — GLYCOPYRROLATE 0.2 MG/ML
INJECTION, SOLUTION INTRAMUSCULAR; INTRAVENOUS PRN
Status: DISCONTINUED | OUTPATIENT
Start: 2024-11-14 | End: 2024-11-14

## 2024-11-14 RX ORDER — HYDROXYZINE HYDROCHLORIDE 25 MG/1
25 TABLET, FILM COATED ORAL
Status: DISCONTINUED | OUTPATIENT
Start: 2024-11-14 | End: 2024-11-15 | Stop reason: HOSPADM

## 2024-11-14 RX ORDER — HYDROMORPHONE HYDROCHLORIDE 1 MG/ML
0.2 INJECTION, SOLUTION INTRAMUSCULAR; INTRAVENOUS; SUBCUTANEOUS EVERY 5 MIN PRN
Status: DISCONTINUED | OUTPATIENT
Start: 2024-11-14 | End: 2024-11-15 | Stop reason: HOSPADM

## 2024-11-14 RX ORDER — LIDOCAINE 40 MG/G
CREAM TOPICAL
Status: DISCONTINUED | OUTPATIENT
Start: 2024-11-14 | End: 2024-11-14 | Stop reason: HOSPADM

## 2024-11-14 RX ORDER — BUPIVACAINE HYDROCHLORIDE AND EPINEPHRINE 2.5; 5 MG/ML; UG/ML
INJECTION, SOLUTION INFILTRATION; PERINEURAL PRN
Status: DISCONTINUED | OUTPATIENT
Start: 2024-11-14 | End: 2024-11-14 | Stop reason: HOSPADM

## 2024-11-14 RX ORDER — HYDROMORPHONE HYDROCHLORIDE 1 MG/ML
0.4 INJECTION, SOLUTION INTRAMUSCULAR; INTRAVENOUS; SUBCUTANEOUS EVERY 5 MIN PRN
Status: DISCONTINUED | OUTPATIENT
Start: 2024-11-14 | End: 2024-11-15 | Stop reason: HOSPADM

## 2024-11-14 RX ORDER — ACETAMINOPHEN 325 MG/1
975 TABLET ORAL ONCE
Status: COMPLETED | OUTPATIENT
Start: 2024-11-14 | End: 2024-11-14

## 2024-11-14 RX ORDER — CEFAZOLIN SODIUM 2 G/50ML
2 SOLUTION INTRAVENOUS SEE ADMIN INSTRUCTIONS
Status: DISCONTINUED | OUTPATIENT
Start: 2024-11-14 | End: 2024-11-14 | Stop reason: HOSPADM

## 2024-11-14 RX ORDER — DEXAMETHASONE SODIUM PHOSPHATE 4 MG/ML
INJECTION, SOLUTION INTRA-ARTICULAR; INTRALESIONAL; INTRAMUSCULAR; INTRAVENOUS; SOFT TISSUE PRN
Status: DISCONTINUED | OUTPATIENT
Start: 2024-11-14 | End: 2024-11-14

## 2024-11-14 RX ADMIN — FENTANYL CITRATE 25 MCG: 50 INJECTION, SOLUTION INTRAMUSCULAR; INTRAVENOUS at 14:25

## 2024-11-14 RX ADMIN — SODIUM CHLORIDE, SODIUM LACTATE, POTASSIUM CHLORIDE, CALCIUM CHLORIDE: 600; 310; 30; 20 INJECTION, SOLUTION INTRAVENOUS at 12:52

## 2024-11-14 RX ADMIN — KETOROLAC TROMETHAMINE 30 MG: 30 INJECTION, SOLUTION INTRAMUSCULAR; INTRAVENOUS at 13:55

## 2024-11-14 RX ADMIN — GLYCOPYRROLATE 0.1 MG: 0.2 INJECTION, SOLUTION INTRAMUSCULAR; INTRAVENOUS at 13:01

## 2024-11-14 RX ADMIN — ACETAMINOPHEN 975 MG: 325 TABLET ORAL at 12:31

## 2024-11-14 RX ADMIN — PROPOFOL 200 MG: 10 INJECTION, EMULSION INTRAVENOUS at 12:59

## 2024-11-14 RX ADMIN — LIDOCAINE HYDROCHLORIDE 100 MG: 20 INJECTION, SOLUTION INFILTRATION; PERINEURAL at 12:59

## 2024-11-14 RX ADMIN — DEXAMETHASONE SODIUM PHOSPHATE 4 MG: 4 INJECTION, SOLUTION INTRA-ARTICULAR; INTRALESIONAL; INTRAMUSCULAR; INTRAVENOUS; SOFT TISSUE at 12:58

## 2024-11-14 RX ADMIN — OXYCODONE HYDROCHLORIDE 5 MG: 5 TABLET ORAL at 14:53

## 2024-11-14 RX ADMIN — CEFAZOLIN SODIUM 2 G: 2 SOLUTION INTRAVENOUS at 12:58

## 2024-11-14 RX ADMIN — FENTANYL CITRATE 50 MCG: 50 INJECTION, SOLUTION INTRAMUSCULAR; INTRAVENOUS at 13:20

## 2024-11-14 RX ADMIN — FENTANYL CITRATE 50 MCG: 50 INJECTION, SOLUTION INTRAMUSCULAR; INTRAVENOUS at 12:59

## 2024-11-14 RX ADMIN — PROPOFOL 150 MCG/KG/MIN: 10 INJECTION, EMULSION INTRAVENOUS at 12:58

## 2024-11-14 RX ADMIN — ONDANSETRON 4 MG: 2 INJECTION INTRAMUSCULAR; INTRAVENOUS at 13:55

## 2024-11-14 RX ADMIN — PROPOFOL 150 MCG/KG/MIN: 10 INJECTION, EMULSION INTRAVENOUS at 13:22

## 2024-11-14 NOTE — ANESTHESIA PROCEDURE NOTES
Airway       Patient location during procedure: OR  Staff -        CRNA: Aleta Cevallos APRN CRNA       Performed By: CRNA  Consent for Airway        Urgency: elective  Indications and Patient Condition       Indications for airway management: yaneth-procedural and airway protection       Induction type:intravenous       Mask difficulty assessment: 0 - not attempted    Final Airway Details       Final airway type: supraglottic airway    Supraglottic Airway Details        Type: LMA       Brand: LMA Unique       LMA size: 5    Post intubation assessment        Placement verified by: capnometry, equal breath sounds and chest rise        Number of attempts at approach: 1       Secured with: tape       Ease of procedure: easy       Dentition: Intact

## 2024-11-14 NOTE — OP NOTE
PREOPERATIVE DIAGNOSIS: Left knee medial meniscus tear.   POSTOPERATIVE DIAGNOSIS: Left knee medial meniscus tear.   PROCEDURES:   1. Examination under anesthesia, left knee.   2. Left knee arthroscopy, partial medial meniscectomy.   SURGEON: José Márquez MD   ASSISTANT: Filomena Mckenna MD  OPERATIVE INDICATIONS: Ez is a very pleasant 44 year old male who presented to my clinic with medial joint line pain. An MRI had been obtained by his primary care physician demonstrating a displaced medial meniscus tear. I reviewed with him his history, physical and imaging exam. I felt that he would be a candidate for knee arthroscopy, partial medial meniscectomy. He was apprised of the risks and benefits of surgery and desired to proceed despite the risks.   OPERATIVE DETAILS: In the preoperative area, the patient's informed consent was reviewed and he desired to proceed. Left knee was marked. The patient was in agreement. he was taken to the operating room, timeout was performed and all parties were in agreement. Preoperative antibiotics was given within 1 hour of time of surgery. He was placed supine on the operating room table, surrendered to LMA anesthesia and examination under anesthesia was performed with the following findings: Full passive range of motion 0 to 135 degrees. No patellar instability. Stable to varus and valgus stress at 0 and 30 degrees. Stable anterior, posterior drawer. No pivot shift. Negative dial test. Posterior drawer 0. Lachman 0. At this time, a bump was placed underneath the ipsilateral hip. Egg crate was placed beneath the well leg. No tourniquet was placed. A side post was utilized. Left leg was prepped and draped in the usual sterile fashion. Standard anteromedial and anterolateral arthroscopic portals were created and diagnostic arthroscopy was performed with the following findings: Medial patellar facet was Grade 2, lateral patellar facet was Grade 1, central patellar ridge was Grade 1,  central trochlea was Grade 1, medial femoral condyle was Grade 2, medial tibial plateau was Grade 2, lateral femoral condyle was Grade 1, lateral tibial plateau was Grade 1. Lateral meniscus was intact. Medial meniscus had a displaced tear. ACL and PCL were intact. Popliteus tendon intact.  Alternating then between a motorized shaver and a small biter, a partial medial meniscectomy was performed until a balanced stable rim of meniscal tissue remained. At this time, all excess arthroscopic fluid and meniscal debris was removed from the knee joint. Copious irrigation was performed. Portal sites were closed with nylon. Sterile dressings were applied. The patient was awakened from anesthesia and transferred to the recovery room in stable condition with stable vital signs.   COMPLICATIONS: None apparent.   DRAINS: None.   SPECIMENS: None.   ESTIMATED BLOOD LOSS: 5 mL.   TOURNIQUET: No tourniquet was placed.  POSTOPERATIVE PLAN: The patient will be weightbearing as tolerated, range of motion as tolerated, can shower on postoperative day #3. No submerging the wounds. No chemical DVT prophylaxis. Follow up in my Orthopedic Clinic in 1 week time. No running, jumping, pounding sports for 6 weeks.

## 2024-11-14 NOTE — ANESTHESIA CARE TRANSFER NOTE
Patient: Ez Mckeon    Procedure: Procedure(s):  left knee examination under anesthesia, knee arthroscopy, meniscectomy       Diagnosis: Acute medial meniscus tear, left, initial encounter [S83.018A]  Diagnosis Additional Information: No value filed.    Anesthesia Type:   General     Note:    Oropharynx: oropharynx clear of all foreign objects and spontaneously breathing  Level of Consciousness: awake  Oxygen Supplementation: face mask  Level of Supplemental Oxygen (L/min / FiO2): 6  Independent Airway: airway patency satisfactory and stable  Dentition: dentition unchanged  Vital Signs Stable: post-procedure vital signs reviewed and stable  Report to RN Given: handoff report given  Patient transferred to: PACU    Handoff Report: Identifed the Patient, Identified the Reponsible Provider, Reviewed the pertinent medical history, Discussed the surgical course, Reviewed Intra-OP anesthesia mangement and issues during anesthesia, Set expectations for post-procedure period and Allowed opportunity for questions and acknowledgement of understanding      Vitals:  Vitals Value Taken Time   BP     Temp     Pulse 95 11/14/24 1403   Resp 14 11/14/24 1403   SpO2 100 % 11/14/24 1403   Vitals shown include unfiled device data.    Electronically Signed By: TONY Quevedo CRNA  November 14, 2024  2:04 PM

## 2024-11-14 NOTE — DISCHARGE INSTRUCTIONS
"    Safety Tips for Using Crutches    Crutch Fit:  Assume good standing posture with shoulders relaxed and crutch tips 6-8 inches out from the side of the foot.  The underarm pad should fall 2-3 fingers width below the armpit.  The handgrip is positioned level with the wrist to allow 30  flexion at the elbow.    Safety Tips:  Bear weight on your hands, not on your armpits.  Do not add extra padding to the underarm pad. This will, in effect, lengthen the crutches and increase risk of nerve injury.  Wear flat, properly fitting shoes. Do not walk in stocking feet, high heels or slippers.  Household hazards:  --Throw rugs should be removed from floors.  --Stairs should be cleared of obstacles.  --Use extra caution on slippery, highly polished, littered or uneven floor surfaces.  --Check for electric cords.  Check crutch tips for excessive wear and keep wing nuts tight.  While walking, look forward with  head up  and  eyes open.  Take equal length steps.  Use BOTH crutches.    Stairs Sequence:  UP: \"Good\" leg first, followed by  bad  leg, then crutches.  DOWN: Crutches, followed by  bad  leg, \"good\" leg.     Walking with Crutches:  Move both crutches forward at the same time.  Non-Weight Bearing (NWB):  Hold the involved leg up and swing through the crutches with the involved leg. The involved leg does not touch the floor.  Toe Touch Weight Bearing (TTWB): Move the involved leg forward. Rest it lightly on the floor for balance only. Step through the crutches with the uninvolved leg.  Partial Weight Bearing (PWB): Move the involved leg forward. Step down the weight of the leg only.  Step through the crutches with the uninvolved leg.  Weight Bearing As Tolerated (WBAT): Move the involved leg forward. Put as much pressure through the involved leg as you can tolerate comfortably. Then step through the crutches with the uninvolved leg.   [facility]  Home Care Following Anesthesia  For 24 " "hours after surgery:  Get plenty of rest.  A responsible adult must stay with you for at least 24 hours after you leave the surgery center.  Do not drive or use heavy equipment.  If you have weakness or tingling, don't drive or use heavy equipment until this feeling goes away.   Do not drink alcohol.   Avoid strenuous or risky activities.  Ask for help when climbing stairs.  You may feel lightheaded.  IF so, sit for a few minutes before standing.  Have someone help you get up.   If you have nausea (feel sick to your stomach): Drink only clear liquids such as apple juice, ginger ale, broth or 7-Up.  Rest may also help.  Be sure to drink enough fluids.  Move to a regular diet as you feel able.   You may have a slight fever.  Call the doctor if your fever is over 100 F (37.7 C) (taken under the tongue) or lasts longer than 24 hours.  You may have a dry mouth, a sore throat, muscle aches or trouble sleeping. These should go away after 24 hours.  Do not make important or legal decisions.   It is recommended to avoid smoking.        Today you received a Marcaine or bupivacaine block to numb the nerves near your surgery site.  This is a block using local anesthetic or \"numbing\" medication injected around the nerves to anesthetize or \"numb\" the area supplied by those nerves.  This block is injected into the muscle layer near your surgical site.  The medication may numb the location where you had surgery for 6-18 hours, but may last up to 24 hours.  If your surgical site is an arm or leg you should be careful with your affected limb, since it is possible to injure your limb without being aware of it due to the numbing.  Until full feeling returns, you should guard against bumping or hitting your limb, and avoid extreme hot or cold temperatures on the skin.  As the block wears off, the feeling will return as a tingling or prickly sensation near your surgical site.  You will experience more discomfort from your incision as the " feeling returns.  You may want to take a pain pill (a narcotic or Tylenol if this was prescribed by your surgeon) when you start to experience mild pain before the pain beccomes more severe.  If your pain medications do not control your pain you should notifiy your surgeon.    Tips for taking pain medications  To get the best pain relief possible, remember these points:  Take pain medications as directed, before pain becomes severe.  Pain medication can upset your stomach: taking it with food may help.  Constipation is a common side effect of pain medication. Drink plenty of  fluids.  Eat foods high in fiber. Take a stool softener if recommended by your doctor or pharmacist.  Do not drink alcohol, drive or operate machinery while taking pain medications.  Ask about other ways to control pain, such as with heat, ice or relaxation.    Tylenol/Acetaminophen Consumption    If you feel your pain relief is insufficient, you may take Tylenol/Acetaminophen in addition to your narcotic pain medication.   Be careful not to exceed 4,000 mg of Tylenol/Acetaminophen in a 24 hour period from all sources.  If you are taking extra strength Tylenol/acetaminophen (500 mg), the maximum dose is 8 tablets in 24 hours.  If you are taking regular strength acetaminophen (325 mg), the maximum dose is 12 tablets in 24 hours.    Call a doctor for any of the following:  Signs of infection (fever, growing tenderness at the surgery site, a large amount of drainage or bleeding, severe pain, foul-smelling drainage, redness, swelling).  It has been over 8 to 10 hours since surgery and you are still not able to urinate (pass water).  Headache for over 24 hours.  Numbness, tingling or weakness the day after surgery (if you had spinal anesthesia).  Signs of Covid-19 infection (temperature over 100 degrees, shortness of breath, cough, loss of taste/smell, generalized body aches, persistent headache, chills, sore throat, nausea/vomiting/diarrhea)  Your  doctor is:       Dr. José Márquez, Orthopaedics: [phone]               Or dial [phone] and ask for the resident on call for:  Orthopaedics  For emergency care, call the:  [facility]:  [phone] (TTY for hearing impaired: [phone])  Tylenol 975 mg given at 1230 pm.   Ok to take more after 630 pm.

## 2024-11-15 ENCOUNTER — MYC MEDICAL ADVICE (OUTPATIENT)
Dept: ORTHOPEDICS | Facility: CLINIC | Age: 44
End: 2024-11-15
Payer: COMMERCIAL

## 2024-11-15 NOTE — ANESTHESIA POSTPROCEDURE EVALUATION
Patient: Ez Mckeon    Procedure: Procedure(s):  left knee examination under anesthesia, knee arthroscopy, meniscectomy       Anesthesia Type:  General    Note:  Disposition: Outpatient   Postop Pain Control: Uneventful            Sign Out: Well controlled pain   PONV: No   Neuro/Psych: Uneventful            Sign Out: Acceptable/Baseline neuro status   Airway/Respiratory: Uneventful            Sign Out: Acceptable/Baseline resp. status   CV/Hemodynamics: Uneventful            Sign Out: Acceptable CV status; No obvious hypovolemia; No obvious fluid overload   Other NRE:    DID A NON-ROUTINE EVENT OCCUR?            Last vitals:  Vitals Value Taken Time   /132 11/14/24 1445   Temp 36.3  C (97.3  F) 11/14/24 1445   Pulse 79 11/14/24 1445   Resp 15 11/14/24 1445   SpO2 96 % 11/14/24 1445       Electronically Signed By: Zacarias Sullivan MD  November 15, 2024  3:38 PM

## 2024-11-15 NOTE — TELEPHONE ENCOUNTER
Sent Mychart (1st Attempt) for the patient to call back and schedule the following:    Appointment type: post op msk  Provider: Dr Márquez  Return date: 1/6/25  Additonal Notes: pt was not avail, sent MyC

## 2024-11-19 ENCOUNTER — TELEPHONE (OUTPATIENT)
Dept: ORTHOPEDICS | Facility: CLINIC | Age: 44
End: 2024-11-19
Payer: COMMERCIAL

## 2024-11-19 NOTE — TELEPHONE ENCOUNTER
Patient confirmed scheduled appointment:     Date: 1/6/25  Time: 2pm  Visit type: post op msk  Visit mode: In Person  Provider:  Dr. José Márquez  [place] [facility]    Additional Notes:

## 2024-11-21 ENCOUNTER — OFFICE VISIT (OUTPATIENT)
Dept: ORTHOPEDICS | Facility: CLINIC | Age: 44
End: 2024-11-21
Payer: COMMERCIAL

## 2024-11-21 DIAGNOSIS — Z48.89 ENCOUNTER FOR POSTOPERATIVE CARE: Primary | ICD-10-CM

## 2024-11-21 DIAGNOSIS — S83.242A ACUTE MEDIAL MENISCUS TEAR, LEFT, INITIAL ENCOUNTER: ICD-10-CM

## 2024-11-21 RX ORDER — ASPIRIN 81 MG/1
162 TABLET ORAL DAILY
Qty: 42 TABLET | Refills: 0 | Status: SHIPPED | OUTPATIENT
Start: 2024-11-21 | End: 2024-12-12

## 2024-11-21 RX ORDER — OXYCODONE HYDROCHLORIDE 5 MG/1
5-10 TABLET ORAL EVERY 4 HOURS PRN
Qty: 10 TABLET | Refills: 0 | Status: SHIPPED | OUTPATIENT
Start: 2024-11-21

## 2024-11-21 NOTE — NURSING NOTE
Reason For Visit:   Chief Complaint   Patient presents with    Surgical Followup     1 week left knee scope/meniscectomy DOS 11/14/24       There were no vitals taken for this visit.         Lakeshia Webber, ATC

## 2024-11-21 NOTE — LETTER
11/21/2024      Ez Mckeon  [address]  [facility] [address]      Dear Colleague,    Thank you for referring your patient, Ez Mckeon, to the [facility] [place]. Please see a copy of my visit note below.    Chief Complaint:   Follow up, DOS 11/14/24 with Dr. Márquez    Procedures:  1. Examination under anesthesia, left knee.   2. Left knee arthroscopy, partial medial meniscectomy    History:  Ez Mckeon is a 44 year old patient s/p above procedure, here for follow up. He has done well since surgery. Pain is controlled with tylenol and oxycodone which he takes 1-2 times daily. Has yet to schedule physical therapy, will call to schedule. Ambulating with use of a cane. Here with his significant other who is a nurse, they do not have any questions or concerns today.       Exam:     General: Awake, Alert, and oriented. Articulates and communicates with a normal affect     Left Lower Extremity:  Incisions well healed without evidence of infection, no erythema, drainage, or wound dehiscence   Normal post-operative effusion and ecchymosis  Range of motion 0-95  +SLR, no lag   TA/Gsc/EHL/FHL with 5/5 strength  Distal pulses palpable, toes are warm and well perfused   Gait: Antalgic without use of assistive device    Imaging:  No new imaging.     Medications:     Current Outpatient Medications:      acetaminophen (TYLENOL) 325 MG tablet, Take 2 tablets (650 mg) by mouth every 4 hours as needed for mild pain., Disp: 50 tablet, Rfl: 0     hydrOXYzine HCl (ATARAX) 25 MG tablet, Take 1 tablet (25 mg) by mouth 3 times daily as needed for itching., Disp: 15 tablet, Rfl: 0     hydrOXYzine HCl (ATARAX) 25 MG tablet, Take 1 tablet (25 mg) by mouth 3 times daily as needed for itching, Disp: 30 tablet, Rfl: 1     ibuprofen (ADVIL/MOTRIN) 600 MG tablet, Take 1 tablet (600 mg) by mouth every 6 hours as needed for moderate pain., Disp: 30 tablet, Rfl: 0     losartan (COZAAR) 50 MG tablet, Take  1 tablet (50 mg) by mouth daily., Disp: 90 tablet, Rfl: 0     ondansetron (ZOFRAN ODT) 4 MG ODT tab, Take 1 tablet (4 mg) by mouth every 8 hours as needed for nausea., Disp: 4 tablet, Rfl: 0     oxyCODONE (ROXICODONE) 5 MG tablet, Take 1-2 tablets (5-10 mg) by mouth every 4 hours as needed for moderate to severe pain., Disp: 10 tablet, Rfl: 0     senna-docusate (SENOKOT-S/PERICOLACE) 8.6-50 MG tablet, Take 1-2 tablets by mouth 2 times daily., Disp: 30 tablet, Rfl: 0    Assessment:  Doing well 1 weeks s/p left knee arthroscopy, partial medial meniscectomy with Dr. Márquez. Basic wound cares were performed today. Nylon sutures were removed, I did not appreciate signs of infection. Ez does continue to smoke, we discussed ASA for DVT prophylaxis for 3 weeks. He is in agreement with this plan. We discussed the plan below, all questions were answered to the best of my ability.     Plan:   -Weight bearing: WBAT   -Range of motion as tolerated   -No running, jumping, pounding sports x 6 weeks   - mg daily x 3 weeks, added today   -Follow up: 6 weeks with Dr. Willi Ojeda PA-C 11/21/2024 3:31 PM  Orthopedic Surgery          Again, thank you for allowing me to participate in the care of your patient.        Sincerely,        CONTRERAS MATTA PA-C

## 2024-11-21 NOTE — PROGRESS NOTES
Chief Complaint:   Follow up, DOS 11/14/24 with Dr. Márquez    Procedures:  1. Examination under anesthesia, left knee.   2. Left knee arthroscopy, partial medial meniscectomy    History:  Ez Mckeon is a 44 year old patient s/p above procedure, here for follow up. He has done well since surgery. Pain is controlled with tylenol and oxycodone which he takes 1-2 times daily. Has yet to schedule physical therapy, will call to schedule. Ambulating with use of a cane. Here with his significant other who is a nurse, they do not have any questions or concerns today.       Exam:     General: Awake, Alert, and oriented. Articulates and communicates with a normal affect     Left Lower Extremity:  Incisions well healed without evidence of infection, no erythema, drainage, or wound dehiscence   Normal post-operative effusion and ecchymosis  Range of motion 0-95  +SLR, no lag   TA/Gsc/EHL/FHL with 5/5 strength  Distal pulses palpable, toes are warm and well perfused   Gait: Antalgic without use of assistive device    Imaging:  No new imaging.     Medications:     Current Outpatient Medications:     acetaminophen (TYLENOL) 325 MG tablet, Take 2 tablets (650 mg) by mouth every 4 hours as needed for mild pain., Disp: 50 tablet, Rfl: 0    hydrOXYzine HCl (ATARAX) 25 MG tablet, Take 1 tablet (25 mg) by mouth 3 times daily as needed for itching., Disp: 15 tablet, Rfl: 0    hydrOXYzine HCl (ATARAX) 25 MG tablet, Take 1 tablet (25 mg) by mouth 3 times daily as needed for itching, Disp: 30 tablet, Rfl: 1    ibuprofen (ADVIL/MOTRIN) 600 MG tablet, Take 1 tablet (600 mg) by mouth every 6 hours as needed for moderate pain., Disp: 30 tablet, Rfl: 0    losartan (COZAAR) 50 MG tablet, Take 1 tablet (50 mg) by mouth daily., Disp: 90 tablet, Rfl: 0    ondansetron (ZOFRAN ODT) 4 MG ODT tab, Take 1 tablet (4 mg) by mouth every 8 hours as needed for nausea., Disp: 4 tablet, Rfl: 0    oxyCODONE (ROXICODONE) 5 MG tablet, Take 1-2 tablets (5-10  mg) by mouth every 4 hours as needed for moderate to severe pain., Disp: 10 tablet, Rfl: 0    senna-docusate (SENOKOT-S/PERICOLACE) 8.6-50 MG tablet, Take 1-2 tablets by mouth 2 times daily., Disp: 30 tablet, Rfl: 0    Assessment:  Doing well 1 weeks s/p left knee arthroscopy, partial medial meniscectomy with Dr. Márquez. Basic wound cares were performed today. Nylon sutures were removed, I did not appreciate signs of infection. Ez does continue to smoke, we discussed ASA for DVT prophylaxis for 3 weeks. He is in agreement with this plan. We discussed the plan below, all questions were answered to the best of my ability.     Plan:   -Weight bearing: WBAT   -Range of motion as tolerated   -No running, jumping, pounding sports x 6 weeks   - mg daily x 3 weeks, added today   -Follow up: 6 weeks with Dr. Willi Ojeda PA-C 11/21/2024 3:31 PM  Orthopedic Surgery

## 2024-12-16 ENCOUNTER — OFFICE VISIT (OUTPATIENT)
Dept: FAMILY MEDICINE | Facility: CLINIC | Age: 44
End: 2024-12-16
Payer: COMMERCIAL

## 2024-12-16 VITALS
RESPIRATION RATE: 15 BRPM | WEIGHT: 226 LBS | HEART RATE: 105 BPM | BODY MASS INDEX: 32.35 KG/M2 | DIASTOLIC BLOOD PRESSURE: 78 MMHG | SYSTOLIC BLOOD PRESSURE: 146 MMHG | HEIGHT: 70 IN | OXYGEN SATURATION: 96 % | TEMPERATURE: 97.4 F

## 2024-12-16 DIAGNOSIS — F41.1 GAD (GENERALIZED ANXIETY DISORDER): ICD-10-CM

## 2024-12-16 DIAGNOSIS — I10 BENIGN HYPERTENSION: Primary | ICD-10-CM

## 2024-12-16 PROCEDURE — G2211 COMPLEX E/M VISIT ADD ON: HCPCS | Performed by: PHYSICIAN ASSISTANT

## 2024-12-16 PROCEDURE — 99214 OFFICE O/P EST MOD 30 MIN: CPT | Performed by: PHYSICIAN ASSISTANT

## 2024-12-16 PROCEDURE — 96127 BRIEF EMOTIONAL/BEHAV ASSMT: CPT | Performed by: PHYSICIAN ASSISTANT

## 2024-12-16 RX ORDER — LOSARTAN POTASSIUM 25 MG/1
75 TABLET ORAL DAILY
Qty: 270 TABLET | Refills: 3 | Status: SHIPPED | OUTPATIENT
Start: 2024-12-16 | End: 2025-12-11

## 2024-12-16 RX ORDER — HYDROXYZINE HYDROCHLORIDE 25 MG/1
25 TABLET, FILM COATED ORAL 3 TIMES DAILY PRN
Qty: 90 TABLET | Refills: 3 | Status: SHIPPED | OUTPATIENT
Start: 2024-12-16

## 2024-12-16 ASSESSMENT — ANXIETY QUESTIONNAIRES
IF YOU CHECKED OFF ANY PROBLEMS ON THIS QUESTIONNAIRE, HOW DIFFICULT HAVE THESE PROBLEMS MADE IT FOR YOU TO DO YOUR WORK, TAKE CARE OF THINGS AT HOME, OR GET ALONG WITH OTHER PEOPLE: NOT DIFFICULT AT ALL
GAD7 TOTAL SCORE: 0
7. FEELING AFRAID AS IF SOMETHING AWFUL MIGHT HAPPEN: NOT AT ALL
7. FEELING AFRAID AS IF SOMETHING AWFUL MIGHT HAPPEN: NOT AT ALL
8. IF YOU CHECKED OFF ANY PROBLEMS, HOW DIFFICULT HAVE THESE MADE IT FOR YOU TO DO YOUR WORK, TAKE CARE OF THINGS AT HOME, OR GET ALONG WITH OTHER PEOPLE?: NOT DIFFICULT AT ALL
3. WORRYING TOO MUCH ABOUT DIFFERENT THINGS: NOT AT ALL
GAD7 TOTAL SCORE: 0
2. NOT BEING ABLE TO STOP OR CONTROL WORRYING: NOT AT ALL
GAD7 TOTAL SCORE: 0
5. BEING SO RESTLESS THAT IT IS HARD TO SIT STILL: NOT AT ALL
1. FEELING NERVOUS, ANXIOUS, OR ON EDGE: NOT AT ALL
6. BECOMING EASILY ANNOYED OR IRRITABLE: NOT AT ALL
4. TROUBLE RELAXING: NOT AT ALL

## 2024-12-16 ASSESSMENT — PATIENT HEALTH QUESTIONNAIRE - PHQ9
SUM OF ALL RESPONSES TO PHQ QUESTIONS 1-9: 0
SUM OF ALL RESPONSES TO PHQ QUESTIONS 1-9: 0
10. IF YOU CHECKED OFF ANY PROBLEMS, HOW DIFFICULT HAVE THESE PROBLEMS MADE IT FOR YOU TO DO YOUR WORK, TAKE CARE OF THINGS AT HOME, OR GET ALONG WITH OTHER PEOPLE: NOT DIFFICULT AT ALL

## 2024-12-16 NOTE — PROGRESS NOTES
"  Assessment & Plan     (I10) Benign hypertension  (primary encounter diagnosis)  Comment: uncontrolled  Plan: losartan (COZAAR) 25 MG tablet          Based on lack of adequate BP control, we will increase to 75 mg per day of losartan and check BP 2-3x per week at home, he will call in 4 weeks with update on readings, goal < 130/80    (F41.1) MINDI (generalized anxiety disorder)  Comment:   Plan: hydrOXYzine HCl (ATARAX) 25 MG tablet              Chronic condition well-controlled today, no medication changes made, follow-up in 1 year but sooner if any worsening symptoms.    BMI  Estimated body mass index is 32.35 kg/m  as calculated from the following:    Height as of this encounter: 1.78 m (5' 10.08\").    Weight as of this encounter: 102.5 kg (226 lb).             Jayda Hui is a 44 year old, presenting for the following health issues:  Hypertension and Recheck Medication      12/16/2024     2:37 PM   Additional Questions   Roomed by TJ   Accompanied by Wife     History of Present Illness       Hypertension: He presents for follow up of hypertension.  He does check blood pressure  regularly outside of the clinic. Outside blood pressures have been over 140/90. He follows a low salt diet.     He eats 2-3 servings of fruits and vegetables daily.He consumes 1 sweetened beverage(s) daily.He exercises with enough effort to increase his heart rate 10 to 19 minutes per day.  He exercises with enough effort to increase his heart rate 4 days per week.   He is taking medications regularly.         Hypertension Follow-up    Do you check your blood pressure regularly outside of the clinic? Yes   Are you following a low salt diet? Yes  Are your blood pressures ever more than 140 on the top number (systolic) OR more   than 90 on the bottom number (diastolic), for example 140/90? Yes    Ez was out of his losartan for the past 2 months, recently restarted medication but is noting continued elevation of BP, " "asymptomatic    Anxiety   How are you doing with your anxiety since your last visit? Improved, stable on hydroxyzine, using 2-3x per week  Are you having other symptoms that might be associated with anxiety? No  Have you had a significant life event? No   Are you feeling depressed? No  Do you have any concerns with your use of alcohol or other drugs? No    Social History     Tobacco Use    Smoking status: Every Day     Current packs/day: 1.50     Types: Cigarettes    Smokeless tobacco: Never   Substance Use Topics    Alcohol use: Yes     Comment: occasionally    Drug use: Yes     Types: Marijuana     Comment: occasional marijuana use         12/16/2024     2:28 PM   MINDI-7 SCORE   Total Score 0 (minimal anxiety)   Total Score 0        Patient-reported         12/16/2024     2:29 PM   PHQ   PHQ-9 Total Score 0    Q9: Thoughts of better off dead/self-harm past 2 weeks Not at all        Patient-reported     \    Objective    BP (!) 135/96 (BP Location: Right arm, Patient Position: Chair, Cuff Size: Adult Large)   Pulse 105   Temp 97.4  F (36.3  C) (Temporal)   Resp 15   Ht 1.78 m (5' 10.08\")   Wt 102.5 kg (226 lb)   SpO2 96%   BMI 32.35 kg/m    Body mass index is 32.35 kg/m .  Physical Exam   GENERAL: healthy, alert and no distress  EYES: Eyes grossly normal to inspection, EOM intact and conjunctivae normal  RESP: breathing comfortably on room air  PSYCH: mentation appears normal, affect normal/bright              Signed Electronically by: Rashad Xavier PA-C    "

## 2025-01-27 ENCOUNTER — OFFICE VISIT (OUTPATIENT)
Dept: ORTHOPEDICS | Facility: CLINIC | Age: 45
End: 2025-01-27
Payer: COMMERCIAL

## 2025-01-27 VITALS — HEIGHT: 70 IN | BODY MASS INDEX: 32.35 KG/M2 | WEIGHT: 226 LBS

## 2025-01-27 DIAGNOSIS — M25.562 CHRONIC PAIN OF LEFT KNEE: Primary | ICD-10-CM

## 2025-01-27 DIAGNOSIS — G89.29 CHRONIC PAIN OF LEFT KNEE: Primary | ICD-10-CM

## 2025-01-27 PROCEDURE — 99024 POSTOP FOLLOW-UP VISIT: CPT | Performed by: ORTHOPAEDIC SURGERY

## 2025-01-27 NOTE — NURSING NOTE
"Reason For Visit:   Chief Complaint   Patient presents with    Left Knee - Follow Up     DOS: 11/14/24; Left knee arthroscopy. menistectomy    RECHECK     DOS: 11/14/24 left knee arthroscopy, meniscectomy       Date of surgery: 11/14/24  Type of surgery:   1. Examination under anesthesia, left knee.   2. Left knee arthroscopy, partial medial meniscectomy.     Patient reports things are going well, able to take the stairs comfortably.     SANE Score  Left Knee: 75-80  Right Knee: 100         Ht 1.78 m (5' 10.08\")   Wt 102.5 kg (226 lb)   BMI 32.35 kg/m           Allergies   Allergen Reactions    Hydrocodone-Acetaminophen Itching       Current Outpatient Medications   Medication Sig Dispense Refill    acetaminophen (TYLENOL) 325 MG tablet Take 2 tablets (650 mg) by mouth every 4 hours as needed for mild pain. 50 tablet 0    hydrOXYzine HCl (ATARAX) 25 MG tablet Take 1 tablet (25 mg) by mouth 3 times daily as needed for itching. 90 tablet 3    ibuprofen (ADVIL/MOTRIN) 600 MG tablet Take 1 tablet (600 mg) by mouth every 6 hours as needed for moderate pain. 30 tablet 0    losartan (COZAAR) 25 MG tablet Take 3 tablets (75 mg) by mouth daily. 270 tablet 3    senna-docusate (SENOKOT-S/PERICOLACE) 8.6-50 MG tablet Take 1-2 tablets by mouth 2 times daily. 30 tablet 0     No current facility-administered medications for this visit.         Lolita Stein, ATC    "

## 2025-01-28 NOTE — PROGRESS NOTES
Diagnosis:  Medial meniscus tear    Procedures:  1. Examination under anesthesia, left knee.   2. Left knee arthroscopy, partial medial meniscectomy  DOS 11/14/24     History:  6 weeks status post the above.  Pain control.  Off opioids.  Doing well.  She    Exam:     General: Awake, Alert, and oriented. Articulates and communicates with a normal affect     Left Lower Extremity:  Incisions well healed without evidence of infection, no erythema, drainage, or wound dehiscence   No post-operative effusion or ecchymosis  Range of motion is full  +SLR, no lag   TA/Gsc/EHL/FHL with 5/5 strength  Distal pulses palpable, toes are warm and well perfused       Imaging:  No new imaging.       Assessment:  6 weeks status post arthroscopic meniscectomy.    Plan:   Weightbearing as tolerated  Range of motion as tolerated.    No specific restrictions   transition back to desired activities   follow-up as needed

## 2025-04-19 ENCOUNTER — HEALTH MAINTENANCE LETTER (OUTPATIENT)
Age: 45
End: 2025-04-19

## (undated) DEVICE — BUR ARTHREX COOLCUT SABRE 4.0MMX13CM AR-8400SR

## (undated) DEVICE — LINEN DRAPE 54X72" 5467

## (undated) DEVICE — PREP CHLORAPREP 26ML TINTED HI-LITE ORANGE 930815

## (undated) DEVICE — TUBING SYSTEM ARTHREX PATIENT REDEUCE AR-6421

## (undated) DEVICE — SU ETHILON 3-0 PS-1 18" 1663H

## (undated) DEVICE — GLOVE BIOGEL PI MICRO SZ 8.0 48580

## (undated) DEVICE — SUCTION MANIFOLD NEPTUNE 2 SYS 4 PORT 0702-020-000

## (undated) DEVICE — GLOVE BIOGEL PI MICRO INDICATOR UNDERGLOVE SZ 8.0 48980

## (undated) DEVICE — PAD ARMBOARD FOAM EGGCRATE COVIDEN 3114367

## (undated) DEVICE — LINEN TOWEL PACK X5 5464

## (undated) DEVICE — PACK ARTHROSCOPY CUSTOM ASC

## (undated) DEVICE — SOL NACL 0.9% IRRIG 3000ML BAG 2B7477

## (undated) RX ORDER — PROPOFOL 10 MG/ML
INJECTION, EMULSION INTRAVENOUS
Status: DISPENSED
Start: 2024-11-14

## (undated) RX ORDER — GLYCOPYRROLATE 0.2 MG/ML
INJECTION INTRAMUSCULAR; INTRAVENOUS
Status: DISPENSED
Start: 2024-11-14

## (undated) RX ORDER — FENTANYL CITRATE 50 UG/ML
INJECTION, SOLUTION INTRAMUSCULAR; INTRAVENOUS
Status: DISPENSED
Start: 2024-11-14

## (undated) RX ORDER — ACETAMINOPHEN 325 MG/1
TABLET ORAL
Status: DISPENSED
Start: 2024-11-14

## (undated) RX ORDER — OXYCODONE HYDROCHLORIDE 5 MG/1
TABLET ORAL
Status: DISPENSED
Start: 2024-11-14

## (undated) RX ORDER — ONDANSETRON 2 MG/ML
INJECTION INTRAMUSCULAR; INTRAVENOUS
Status: DISPENSED
Start: 2024-11-14

## (undated) RX ORDER — KETOROLAC TROMETHAMINE 30 MG/ML
INJECTION, SOLUTION INTRAMUSCULAR; INTRAVENOUS
Status: DISPENSED
Start: 2024-11-14

## (undated) RX ORDER — CEFAZOLIN SODIUM 2 G/50ML
SOLUTION INTRAVENOUS
Status: DISPENSED
Start: 2024-11-14

## (undated) RX ORDER — DEXAMETHASONE SODIUM PHOSPHATE 4 MG/ML
INJECTION, SOLUTION INTRA-ARTICULAR; INTRALESIONAL; INTRAMUSCULAR; INTRAVENOUS; SOFT TISSUE
Status: DISPENSED
Start: 2024-11-14